# Patient Record
Sex: FEMALE | Race: ASIAN | NOT HISPANIC OR LATINO
[De-identification: names, ages, dates, MRNs, and addresses within clinical notes are randomized per-mention and may not be internally consistent; named-entity substitution may affect disease eponyms.]

---

## 2017-02-28 ENCOUNTER — APPOINTMENT (OUTPATIENT)
Dept: ANTEPARTUM | Facility: CLINIC | Age: 27
End: 2017-02-28

## 2017-02-28 ENCOUNTER — ASOB RESULT (OUTPATIENT)
Age: 27
End: 2017-02-28

## 2017-05-23 ENCOUNTER — ASOB RESULT (OUTPATIENT)
Age: 27
End: 2017-05-23

## 2017-05-23 ENCOUNTER — APPOINTMENT (OUTPATIENT)
Dept: ANTEPARTUM | Facility: CLINIC | Age: 27
End: 2017-05-23

## 2017-06-22 ENCOUNTER — OUTPATIENT (OUTPATIENT)
Dept: OUTPATIENT SERVICES | Facility: HOSPITAL | Age: 27
LOS: 1 days | End: 2017-06-22
Payer: MEDICAID

## 2017-06-22 ENCOUNTER — INPATIENT (INPATIENT)
Facility: HOSPITAL | Age: 27
LOS: 1 days | Discharge: HOME | End: 2017-06-24
Attending: OBSTETRICS & GYNECOLOGY | Admitting: OBSTETRICS & GYNECOLOGY

## 2017-06-22 VITALS — WEIGHT: 167.55 LBS | HEIGHT: 65 IN

## 2017-06-22 DIAGNOSIS — O26.899 OTHER SPECIFIED PREGNANCY RELATED CONDITIONS, UNSPECIFIED TRIMESTER: ICD-10-CM

## 2017-06-22 DIAGNOSIS — Z3A.00 WEEKS OF GESTATION OF PREGNANCY NOT SPECIFIED: ICD-10-CM

## 2017-06-22 LAB
BLD GP AB SCN SERPL QL: NEGATIVE — SIGNIFICANT CHANGE UP
HCT VFR BLD CALC: 40.7 % — SIGNIFICANT CHANGE UP (ref 34.5–45)
HGB BLD-MCNC: 14.1 G/DL — SIGNIFICANT CHANGE UP (ref 11.5–15.5)
MCHC RBC-ENTMCNC: 28.9 PG — SIGNIFICANT CHANGE UP (ref 27–34)
MCHC RBC-ENTMCNC: 34.6 % — SIGNIFICANT CHANGE UP (ref 32–36)
MCV RBC AUTO: 83.4 FL — SIGNIFICANT CHANGE UP (ref 80–100)
PLATELET # BLD AUTO: 343 K/UL — SIGNIFICANT CHANGE UP (ref 150–400)
PMV BLD: 10.9 FL — SIGNIFICANT CHANGE UP (ref 7–13)
RBC # BLD: 4.88 M/UL — SIGNIFICANT CHANGE UP (ref 3.8–5.2)
RBC # FLD: 15.3 % — HIGH (ref 10.3–14.5)
RH IG SCN BLD-IMP: POSITIVE — SIGNIFICANT CHANGE UP
WBC # BLD: 22.86 K/UL — HIGH (ref 3.8–10.5)
WBC # FLD AUTO: 22.86 K/UL — HIGH (ref 3.8–10.5)

## 2017-06-22 PROCEDURE — 99204 OFFICE O/P NEW MOD 45 MIN: CPT

## 2017-06-22 RX ORDER — SODIUM CHLORIDE 9 MG/ML
3 INJECTION INTRAMUSCULAR; INTRAVENOUS; SUBCUTANEOUS EVERY 8 HOURS
Qty: 0 | Refills: 0 | Status: DISCONTINUED | OUTPATIENT
Start: 2017-06-23 | End: 2017-06-24

## 2017-06-22 RX ORDER — IBUPROFEN 200 MG
600 TABLET ORAL EVERY 6 HOURS
Qty: 0 | Refills: 0 | Status: COMPLETED | OUTPATIENT
Start: 2017-06-23 | End: 2018-05-22

## 2017-06-22 RX ORDER — AER TRAVELER 0.5 G/1
1 SOLUTION RECTAL; TOPICAL EVERY 4 HOURS
Qty: 0 | Refills: 0 | Status: DISCONTINUED | OUTPATIENT
Start: 2017-06-23 | End: 2017-06-24

## 2017-06-22 RX ORDER — SODIUM CHLORIDE 9 MG/ML
3 INJECTION INTRAMUSCULAR; INTRAVENOUS; SUBCUTANEOUS EVERY 8 HOURS
Qty: 0 | Refills: 0 | Status: DISCONTINUED | OUTPATIENT
Start: 2017-06-22 | End: 2017-06-23

## 2017-06-22 RX ORDER — DIPHENHYDRAMINE HCL 50 MG
25 CAPSULE ORAL EVERY 6 HOURS
Qty: 0 | Refills: 0 | Status: DISCONTINUED | OUTPATIENT
Start: 2017-06-23 | End: 2017-06-24

## 2017-06-22 RX ORDER — DIBUCAINE 1 %
1 OINTMENT (GRAM) RECTAL EVERY 4 HOURS
Qty: 0 | Refills: 0 | Status: DISCONTINUED | OUTPATIENT
Start: 2017-06-23 | End: 2017-06-24

## 2017-06-22 RX ORDER — OXYTOCIN 10 UNIT/ML
41.67 VIAL (ML) INJECTION
Qty: 20 | Refills: 0 | Status: DISCONTINUED | OUTPATIENT
Start: 2017-06-22 | End: 2017-06-23

## 2017-06-22 RX ORDER — SODIUM CHLORIDE 9 MG/ML
1000 INJECTION, SOLUTION INTRAVENOUS
Qty: 0 | Refills: 0 | Status: DISCONTINUED | OUTPATIENT
Start: 2017-06-22 | End: 2017-06-22

## 2017-06-22 RX ORDER — MAGNESIUM HYDROXIDE 400 MG/1
30 TABLET, CHEWABLE ORAL
Qty: 0 | Refills: 0 | Status: DISCONTINUED | OUTPATIENT
Start: 2017-06-23 | End: 2017-06-24

## 2017-06-22 RX ORDER — TETANUS TOXOID, REDUCED DIPHTHERIA TOXOID AND ACELLULAR PERTUSSIS VACCINE, ADSORBED 5; 2.5; 8; 8; 2.5 [IU]/.5ML; [IU]/.5ML; UG/.5ML; UG/.5ML; UG/.5ML
0.5 SUSPENSION INTRAMUSCULAR ONCE
Qty: 0 | Refills: 0 | Status: DISCONTINUED | OUTPATIENT
Start: 2017-06-23 | End: 2017-06-24

## 2017-06-22 RX ORDER — OXYTOCIN 10 UNIT/ML
333.33 VIAL (ML) INJECTION
Qty: 20 | Refills: 0 | Status: COMPLETED | OUTPATIENT
Start: 2017-06-22

## 2017-06-22 RX ORDER — LANOLIN
1 OINTMENT (GRAM) TOPICAL EVERY 6 HOURS
Qty: 0 | Refills: 0 | Status: DISCONTINUED | OUTPATIENT
Start: 2017-06-23 | End: 2017-06-24

## 2017-06-22 RX ORDER — ACETAMINOPHEN 500 MG
975 TABLET ORAL EVERY 6 HOURS
Qty: 0 | Refills: 0 | Status: COMPLETED | OUTPATIENT
Start: 2017-06-23 | End: 2018-05-22

## 2017-06-22 RX ORDER — DOCUSATE SODIUM 100 MG
100 CAPSULE ORAL
Qty: 0 | Refills: 0 | Status: DISCONTINUED | OUTPATIENT
Start: 2017-06-23 | End: 2017-06-24

## 2017-06-22 RX ORDER — OXYTOCIN 10 UNIT/ML
333.33 VIAL (ML) INJECTION
Qty: 20 | Refills: 0 | Status: DISCONTINUED | OUTPATIENT
Start: 2017-06-22 | End: 2017-06-23

## 2017-06-22 RX ORDER — CITRIC ACID/SODIUM CITRATE 300-500 MG
15 SOLUTION, ORAL ORAL EVERY 4 HOURS
Qty: 0 | Refills: 0 | Status: DISCONTINUED | OUTPATIENT
Start: 2017-06-22 | End: 2017-06-22

## 2017-06-22 RX ORDER — SIMETHICONE 80 MG/1
80 TABLET, CHEWABLE ORAL EVERY 6 HOURS
Qty: 0 | Refills: 0 | Status: DISCONTINUED | OUTPATIENT
Start: 2017-06-23 | End: 2017-06-24

## 2017-06-22 RX ORDER — KETOROLAC TROMETHAMINE 30 MG/ML
30 SYRINGE (ML) INJECTION ONCE
Qty: 0 | Refills: 0 | Status: DISCONTINUED | OUTPATIENT
Start: 2017-06-23 | End: 2017-06-24

## 2017-06-22 RX ORDER — PRAMOXINE HYDROCHLORIDE 150 MG/15G
1 AEROSOL, FOAM RECTAL EVERY 4 HOURS
Qty: 0 | Refills: 0 | Status: DISCONTINUED | OUTPATIENT
Start: 2017-06-22 | End: 2017-06-23

## 2017-06-22 RX ORDER — OXYCODONE HYDROCHLORIDE 5 MG/1
5 TABLET ORAL
Qty: 0 | Refills: 0 | Status: DISCONTINUED | OUTPATIENT
Start: 2017-06-23 | End: 2017-06-24

## 2017-06-22 RX ORDER — HYDROCORTISONE 1 %
1 OINTMENT (GRAM) TOPICAL EVERY 4 HOURS
Qty: 0 | Refills: 0 | Status: DISCONTINUED | OUTPATIENT
Start: 2017-06-22 | End: 2017-06-23

## 2017-06-22 RX ORDER — OXYCODONE HYDROCHLORIDE 5 MG/1
5 TABLET ORAL EVERY 4 HOURS
Qty: 0 | Refills: 0 | Status: DISCONTINUED | OUTPATIENT
Start: 2017-06-23 | End: 2017-06-24

## 2017-06-22 RX ORDER — DIBUCAINE 1 %
1 OINTMENT (GRAM) RECTAL EVERY 4 HOURS
Qty: 0 | Refills: 0 | Status: DISCONTINUED | OUTPATIENT
Start: 2017-06-22 | End: 2017-06-23

## 2017-06-22 RX ORDER — SODIUM CHLORIDE 9 MG/ML
1000 INJECTION, SOLUTION INTRAVENOUS ONCE
Qty: 0 | Refills: 0 | Status: COMPLETED | OUTPATIENT
Start: 2017-06-22 | End: 2017-06-22

## 2017-06-22 RX ORDER — GLYCERIN ADULT
1 SUPPOSITORY, RECTAL RECTAL AT BEDTIME
Qty: 0 | Refills: 0 | Status: DISCONTINUED | OUTPATIENT
Start: 2017-06-23 | End: 2017-06-24

## 2017-06-22 RX ORDER — AER TRAVELER 0.5 G/1
1 SOLUTION RECTAL; TOPICAL EVERY 4 HOURS
Qty: 0 | Refills: 0 | Status: DISCONTINUED | OUTPATIENT
Start: 2017-06-22 | End: 2017-06-23

## 2017-06-22 RX ADMIN — SODIUM CHLORIDE 2000 MILLILITER(S): 9 INJECTION, SOLUTION INTRAVENOUS at 19:15

## 2017-06-22 RX ADMIN — SODIUM CHLORIDE 125 MILLILITER(S): 9 INJECTION, SOLUTION INTRAVENOUS at 19:51

## 2017-06-22 RX ADMIN — Medication 1000 MILLIUNIT(S)/MIN: at 22:56

## 2017-06-22 RX ADMIN — Medication 125 MILLIUNIT(S)/MIN: at 22:57

## 2017-06-23 LAB — T PALLIDUM AB TITR SER: NEGATIVE — SIGNIFICANT CHANGE UP

## 2017-06-23 RX ORDER — ACETAMINOPHEN 500 MG
3 TABLET ORAL
Qty: 0 | Refills: 0 | DISCHARGE
Start: 2017-06-23

## 2017-06-23 RX ORDER — HYDROCORTISONE 1 %
1 OINTMENT (GRAM) TOPICAL EVERY 4 HOURS
Qty: 0 | Refills: 0 | Status: DISCONTINUED | OUTPATIENT
Start: 2017-06-23 | End: 2017-06-24

## 2017-06-23 RX ORDER — PRAMOXINE HYDROCHLORIDE 150 MG/15G
1 AEROSOL, FOAM RECTAL EVERY 4 HOURS
Qty: 0 | Refills: 0 | Status: DISCONTINUED | OUTPATIENT
Start: 2017-06-23 | End: 2017-06-24

## 2017-06-23 RX ORDER — IBUPROFEN 200 MG
600 TABLET ORAL EVERY 6 HOURS
Qty: 0 | Refills: 0 | Status: DISCONTINUED | OUTPATIENT
Start: 2017-06-23 | End: 2017-06-24

## 2017-06-23 RX ORDER — HYDROCORTISONE 1 %
1 OINTMENT (GRAM) TOPICAL EVERY 4 HOURS
Qty: 0 | Refills: 0 | Status: DISCONTINUED | OUTPATIENT
Start: 2017-06-23 | End: 2017-06-23

## 2017-06-23 RX ORDER — IBUPROFEN 200 MG
1 TABLET ORAL
Qty: 0 | Refills: 0 | DISCHARGE
Start: 2017-06-23

## 2017-06-23 RX ORDER — PRAMOXINE HYDROCHLORIDE 150 MG/15G
1 AEROSOL, FOAM RECTAL EVERY 4 HOURS
Qty: 0 | Refills: 0 | Status: DISCONTINUED | OUTPATIENT
Start: 2017-06-23 | End: 2017-06-23

## 2017-06-23 RX ORDER — ACETAMINOPHEN 500 MG
975 TABLET ORAL EVERY 6 HOURS
Qty: 0 | Refills: 0 | Status: DISCONTINUED | OUTPATIENT
Start: 2017-06-23 | End: 2017-06-24

## 2017-06-23 RX ADMIN — Medication 1 TABLET(S): at 16:23

## 2017-06-23 RX ADMIN — Medication 600 MILLIGRAM(S): at 05:02

## 2017-06-23 RX ADMIN — Medication 975 MILLIGRAM(S): at 18:00

## 2017-06-23 RX ADMIN — Medication 975 MILLIGRAM(S): at 13:22

## 2017-06-23 RX ADMIN — Medication 975 MILLIGRAM(S): at 19:00

## 2017-06-23 RX ADMIN — Medication 975 MILLIGRAM(S): at 05:02

## 2017-06-23 RX ADMIN — Medication 600 MILLIGRAM(S): at 18:00

## 2017-06-23 RX ADMIN — Medication 975 MILLIGRAM(S): at 23:58

## 2017-06-23 RX ADMIN — Medication 600 MILLIGRAM(S): at 13:22

## 2017-06-23 RX ADMIN — Medication 975 MILLIGRAM(S): at 12:22

## 2017-06-23 RX ADMIN — Medication 100 MILLIGRAM(S): at 05:02

## 2017-06-23 RX ADMIN — Medication 600 MILLIGRAM(S): at 12:23

## 2017-06-23 RX ADMIN — Medication 600 MILLIGRAM(S): at 19:00

## 2017-06-23 NOTE — DISCHARGE NOTE OB - MATERIALS PROVIDED
Shaken Baby Prevention Handout/Guide to Postpartum Care/Vaccinations/Breastfeeding Log/Breastfeeding Guide and Packet/  Immunization Record/[facility] [place] Screening Program

## 2017-06-23 NOTE — PROGRESS NOTE ADULT - SUBJECTIVE AND OBJECTIVE BOX
Patient evaluated at bedside.   Patient's pain is well controlled with  Patient denies headache, dizziness, chest pain, palpitations, shortness of breath, nausea, vomiting, heavy vaginal bleeding or perineal discomfort.  Patient has been ambulating without assistance, voiding spontaneously, tolerating diet, and is breastfeeding.    Physical Exam:  Vital Signs Last 24 Hrs  T(C): 36.6, Max: 36.8 (06-22 @ 21:02)  T(F): 97.9, Max: 98.2 (06-22 @ 21:02)  HR: 86 (82 - 97)  BP: 121/66 (100/65 - 136/67)  BP(mean): --  RR: 16 (16 - 18)  SpO2: 98% (97% - 100%)  I&O's Summary    I & Os for current day (as of 23 Jun 2017 10:26)  =============================================  IN: 2125 ml / OUT: 2100 ml / NET: 25 ml      NAD, A+0 x 3                          14.1   22.86 )-----------( 343      ( 22 Jun 2017 19:17 )             40.7                 Assessment:  stable postpartum  labs wnl  breastfeeding well    Plan:  encourage ambulation and po fluids  Encourage breastfeeding  anticipate discharge home tomorrow

## 2017-06-23 NOTE — PROVIDER CONTACT NOTE (OTHER) - ASSESSMENT
Patient's bleeding is moderate, patient stated she has been voiding, and denies any discomfort other then cramping at the lower abdomen, patient also reports right groin pain when she bends her right leg, patient denies any calf pain or tenderness and positive pedal pulses are palpable bilaterally

## 2017-06-23 NOTE — DISCHARGE NOTE OB - PATIENT PORTAL LINK FT
“You can access the FollowHealth Patient Portal, offered by [facility], by registering with the following website: [email or web address]”

## 2017-06-23 NOTE — DISCHARGE NOTE OB - CARE PROVIDER_API CALL
Daya Martinez), Gynecology [facility] Obstetrics  Gynecology Obstetrics and Gynecology  [address] [place] [address]  Phone: [phone]  Fax: [phone]

## 2017-06-24 VITALS
HEART RATE: 70 BPM | OXYGEN SATURATION: 99 % | SYSTOLIC BLOOD PRESSURE: 103 MMHG | RESPIRATION RATE: 16 BRPM | TEMPERATURE: 98 F | DIASTOLIC BLOOD PRESSURE: 71 MMHG

## 2017-06-24 RX ADMIN — Medication 600 MILLIGRAM(S): at 05:31

## 2017-06-24 RX ADMIN — Medication 975 MILLIGRAM(S): at 05:31

## 2017-06-24 RX ADMIN — Medication 600 MILLIGRAM(S): at 06:27

## 2017-06-24 RX ADMIN — Medication 975 MILLIGRAM(S): at 01:00

## 2017-06-24 RX ADMIN — Medication 975 MILLIGRAM(S): at 06:26

## 2017-06-24 NOTE — PROGRESS NOTE ADULT - SUBJECTIVE AND OBJECTIVE BOX
Patient evaluated at bedside.   Patient's pain is well controlled   Patient denies headache, dizziness, chest pain, palpitations, shortness of breath, nausea, vomiting, heavy vaginal bleeding or perineal discomfort.  Patient has been ambulating without assistance, voiding spontaneously, tolerating diet, and is breastfeeding.    Physical Exam:  Vital Signs Last 24 Hrs  T(C): 36.5, Max: 36.7 (06-23 @ 10:38)  T(F): 97.7, Max: 98.1 (06-23 @ 10:38)  HR: 70 (70 - 91)  BP: 103/71 (103/71 - 127/62)  BP(mean): --  RR: 16 (16 - 17)  SpO2: 99% (98% - 99%)  I&O's Summary    I & Os for current day (as of 24 Jun 2017 08:36)  =============================================  IN: 0 ml / OUT: 900 ml / NET: -900 ml      NAD, A+0 x 3                          14.1   22.86 )-----------( 343      ( 22 Jun 2017 19:17 )             40.7                 Assessment:  stable postpartum  labs wnl  breastfeeding well    Plan:  encourage ambulation and po fluids  Encourage breastfeeding  discharge home

## 2017-06-24 NOTE — LACTATION INITIAL EVALUATION - LATCH: HOLD (POSITIONING) INFANT
full assist (staff holds infant at breast) minimal assist, teach one side; mother does other, staff holds

## 2017-06-24 NOTE — LACTATION INITIAL EVALUATION - LACTATION INTERVENTIONS
initiate skin to skin/initiate hand expression routine/assisted with deep latch and positioning  discussed  signs  of  effective  feeding and  swallowing.  discussed  compression at  breast when  nbn  stops  drinking  and  is  still sucking.  , if  nbn  not  breastfeeding  effectively  hand  express  and  pump  and   give  teaspoons  between  feedings alternative  feeding   method.

## 2017-06-24 NOTE — LACTATION INITIAL EVALUATION - INTERVENTION OUTCOME
nbn demonstrated  deep latch and  performed  with sucking and swallowing  noted   pt instructed to notify pediatrician if nbn not feeding 8-12 times/24 hours and not voiding and stooling according to breastfeeding log.   ,  follow  up outside  lactation  resources  given  ./verbalizes understanding

## 2019-12-27 ENCOUNTER — EMERGENCY (EMERGENCY)
Facility: HOSPITAL | Age: 29
LOS: 1 days | Discharge: HOME | End: 2019-12-27
Attending: EMERGENCY MEDICINE | Admitting: EMERGENCY MEDICINE
Payer: COMMERCIAL

## 2019-12-27 VITALS
HEART RATE: 91 BPM | SYSTOLIC BLOOD PRESSURE: 116 MMHG | DIASTOLIC BLOOD PRESSURE: 75 MMHG | TEMPERATURE: 98 F | OXYGEN SATURATION: 99 % | RESPIRATION RATE: 18 BRPM | WEIGHT: 145.06 LBS

## 2019-12-27 LAB
APPEARANCE UR: CLEAR — SIGNIFICANT CHANGE UP
BACTERIA # UR AUTO: ABNORMAL
BILIRUB UR-MCNC: NEGATIVE — SIGNIFICANT CHANGE UP
COLOR SPEC: SIGNIFICANT CHANGE UP
DIFF PNL FLD: ABNORMAL
EPI CELLS # UR: SIGNIFICANT CHANGE UP
GLUCOSE UR QL: NEGATIVE — SIGNIFICANT CHANGE UP
HCG UR QL: NEGATIVE — SIGNIFICANT CHANGE UP
KETONES UR-MCNC: NEGATIVE — SIGNIFICANT CHANGE UP
LEUKOCYTE ESTERASE UR-ACNC: ABNORMAL
NITRITE UR-MCNC: NEGATIVE — SIGNIFICANT CHANGE UP
PH UR: 7 — SIGNIFICANT CHANGE UP (ref 5–8)
PROT UR-MCNC: NEGATIVE — SIGNIFICANT CHANGE UP
RBC CASTS # UR COMP ASSIST: >50 /HPF (ref 0–4)
SP GR SPEC: 1 — LOW (ref 1.01–1.02)
UROBILINOGEN FLD QL: NEGATIVE — SIGNIFICANT CHANGE UP
WBC UR QL: >50

## 2019-12-27 PROCEDURE — 87086 URINE CULTURE/COLONY COUNT: CPT

## 2019-12-27 PROCEDURE — 99283 EMERGENCY DEPT VISIT LOW MDM: CPT

## 2019-12-27 PROCEDURE — 81025 URINE PREGNANCY TEST: CPT

## 2019-12-27 PROCEDURE — 81001 URINALYSIS AUTO W/SCOPE: CPT

## 2019-12-27 RX ORDER — PHENAZOPYRIDINE HCL 100 MG
1 TABLET ORAL
Qty: 6 | Refills: 0
Start: 2019-12-27 | End: 2019-12-28

## 2019-12-27 RX ORDER — CEFUROXIME AXETIL 250 MG
1 TABLET ORAL
Qty: 14 | Refills: 0
Start: 2019-12-27 | End: 2020-01-02

## 2019-12-27 RX ORDER — CEFUROXIME AXETIL 250 MG
500 TABLET ORAL ONCE
Refills: 0 | Status: COMPLETED | OUTPATIENT
Start: 2019-12-27 | End: 2019-12-27

## 2019-12-27 RX ORDER — PHENAZOPYRIDINE HCL 100 MG
200 TABLET ORAL ONCE
Refills: 0 | Status: COMPLETED | OUTPATIENT
Start: 2019-12-27 | End: 2019-12-27

## 2019-12-27 RX ADMIN — Medication 200 MILLIGRAM(S): at 13:21

## 2019-12-27 RX ADMIN — Medication 500 MILLIGRAM(S): at 13:21

## 2019-12-27 NOTE — ED PROVIDER NOTE - OBJECTIVE STATEMENT
Pt is a 28 yo female with no pmhx c/o of dysuria increased urinary urgency frequency only small amount of urine coming out since last night denies any flank pain nvd fever chills + red blood in urine no hx of kidney stones. Pt denies any vaginal discharge

## 2019-12-27 NOTE — ED ADULT NURSE NOTE - OBJECTIVE STATEMENT
Received  Pt in bed #3A Pt a&o C/O urinary frequency, urgency & burning since last night Pt seen by LISETH Bonilla

## 2019-12-27 NOTE — ED PROVIDER NOTE - NSFOLLOWUPINSTRUCTIONS_ED_ALL_ED_FT
Please follow up with pcp drink plenty of water return for fever worsening symptoms vomiting take medications as directed

## 2019-12-27 NOTE — ED PROVIDER NOTE - ATTENDING CONTRIBUTION TO CARE
30 yo female no pmhx c/o frequency urgency dysuria since last night, noted to have some hematuria.  No fever/chills, no back pain, no nausea/vomiting    Gen: Alert, NAD  Head/eyes: NC/AT, PERRL, EOMI  ENT: airway patent  Neck: supple, no tenderness/meningismus/JVD, Trachea midline  Pulm/lung: Bilateral clear BS, normal resp effort, no wheeze/stridor/retractions  CV/heart: RRR, no M/R/G, +2 dist pulses (radial, pedal DP/PT, popliteal)  GI/Abd: soft, NT/ND, +BS, no guarding/rebound tenderness  Musculoskeletal: no edema/erythema/cyanosis, FROM in all extremities, no C/T/L spine ttp, no CVAT b/l   Skin: no rash, no vesicles, no petechaie, no ecchymosis, no swelling  Neuro: AAOx3, CN 2-12 intact, normal sensation, 5/5 motor strength in all extremities, normal gait, no dysmetria     +UTI, PO abx, understands to return if any worsening symptoms

## 2019-12-27 NOTE — ED PROVIDER NOTE - PATIENT PORTAL LINK FT
You can access the FollowMyHealth Patient Portal offered by [facility] by registering at the following website: [email or web address]. By joining [employer]’s FollowMyHealth portal, you will also be able to view your health information using other applications (apps) compatible with our system.

## 2019-12-27 NOTE — ED ADULT NURSE NOTE - IN THE PAST 12 MONTHS HAVE YOU USED DRUGS OTHER THAN THOSE REQUIRED FOR MEDICAL REASON?
[Clear TM bilaterally] : clear tympanic membranes bilaterally [Clear Rhinorrhea] : clear rhinorrhea [Transmitted Upper Airway Sounds] : transmitted upper airway sounds [NL] : warm [Irritable] : irritable [FreeTextEntry7] : very frequent cough during the exam  great deal of loose secretions No

## 2019-12-28 LAB
CULTURE RESULTS: SIGNIFICANT CHANGE UP
SPECIMEN SOURCE: SIGNIFICANT CHANGE UP

## 2021-03-01 ENCOUNTER — APPOINTMENT (OUTPATIENT)
Dept: ANTEPARTUM | Facility: CLINIC | Age: 31
End: 2021-03-01
Payer: COMMERCIAL

## 2021-03-01 ENCOUNTER — ASOB RESULT (OUTPATIENT)
Age: 31
End: 2021-03-01

## 2021-03-01 DIAGNOSIS — O35.1XX0 MATERNAL CARE FOR (SUSPECTED) CHROMOSOMAL ABNORMALITY IN FETUS, NOT APPLICABLE OR UNSPECIFIED: ICD-10-CM

## 2021-03-01 PROCEDURE — 99072 ADDL SUPL MATRL&STAF TM PHE: CPT

## 2021-03-01 PROCEDURE — 76813 OB US NUCHAL MEAS 1 GEST: CPT | Mod: 59

## 2021-03-01 PROCEDURE — 36416 COLLJ CAPILLARY BLOOD SPEC: CPT

## 2021-03-05 LAB
1ST TRIMESTER DATA: NORMAL
ADDENDUM DOC: NORMAL
AFP PNL SERPL: NORMAL
AFP SERPL-ACNC: NORMAL
CLINICAL BIOCHEMIST REVIEW: NORMAL
FREE BETA HCG 1ST TRIMESTER: NORMAL
Lab: NORMAL
NOTES NTD: NORMAL
NT: NORMAL
PAPP-A SERPL-ACNC: NORMAL
TRISOMY 18/3: NORMAL

## 2021-04-19 ENCOUNTER — APPOINTMENT (OUTPATIENT)
Dept: ANTEPARTUM | Facility: CLINIC | Age: 31
End: 2021-04-19

## 2021-04-29 ENCOUNTER — ASOB RESULT (OUTPATIENT)
Age: 31
End: 2021-04-29

## 2021-04-29 ENCOUNTER — APPOINTMENT (OUTPATIENT)
Dept: ANTEPARTUM | Facility: CLINIC | Age: 31
End: 2021-04-29
Payer: COMMERCIAL

## 2021-04-29 PROCEDURE — 76805 OB US >/= 14 WKS SNGL FETUS: CPT

## 2021-04-29 PROCEDURE — 99072 ADDL SUPL MATRL&STAF TM PHE: CPT

## 2021-07-08 ENCOUNTER — APPOINTMENT (OUTPATIENT)
Dept: ANTEPARTUM | Facility: CLINIC | Age: 31
End: 2021-07-08

## 2021-07-14 ENCOUNTER — APPOINTMENT (OUTPATIENT)
Dept: ANTEPARTUM | Facility: CLINIC | Age: 31
End: 2021-07-14
Payer: COMMERCIAL

## 2021-07-14 ENCOUNTER — ASOB RESULT (OUTPATIENT)
Age: 31
End: 2021-07-14

## 2021-07-14 ENCOUNTER — TRANSCRIPTION ENCOUNTER (OUTPATIENT)
Age: 31
End: 2021-07-14

## 2021-07-14 PROCEDURE — 76816 OB US FOLLOW-UP PER FETUS: CPT

## 2021-07-14 PROCEDURE — 99072 ADDL SUPL MATRL&STAF TM PHE: CPT

## 2021-08-18 ENCOUNTER — OUTPATIENT (OUTPATIENT)
Dept: INPATIENT UNIT | Facility: HOSPITAL | Age: 31
LOS: 1 days | Discharge: HOME | End: 2021-08-18
Payer: MEDICAID

## 2021-08-18 ENCOUNTER — INPATIENT (INPATIENT)
Facility: HOSPITAL | Age: 31
LOS: 0 days | Discharge: HOME | End: 2021-08-19
Attending: OBSTETRICS & GYNECOLOGY | Admitting: OBSTETRICS & GYNECOLOGY

## 2021-08-18 ENCOUNTER — TRANSCRIPTION ENCOUNTER (OUTPATIENT)
Age: 31
End: 2021-08-18

## 2021-08-18 VITALS
DIASTOLIC BLOOD PRESSURE: 74 MMHG | TEMPERATURE: 98 F | SYSTOLIC BLOOD PRESSURE: 148 MMHG | HEART RATE: 100 BPM | RESPIRATION RATE: 16 BRPM

## 2021-08-18 VITALS — HEART RATE: 93 BPM | DIASTOLIC BLOOD PRESSURE: 80 MMHG | SYSTOLIC BLOOD PRESSURE: 134 MMHG

## 2021-08-18 VITALS — SYSTOLIC BLOOD PRESSURE: 132 MMHG | DIASTOLIC BLOOD PRESSURE: 66 MMHG | HEART RATE: 82 BPM

## 2021-08-18 DIAGNOSIS — Z3A.00 WEEKS OF GESTATION OF PREGNANCY NOT SPECIFIED: ICD-10-CM

## 2021-08-18 DIAGNOSIS — O26.899 OTHER SPECIFIED PREGNANCY RELATED CONDITIONS, UNSPECIFIED TRIMESTER: ICD-10-CM

## 2021-08-18 LAB
BASOPHILS # BLD AUTO: 0.16 K/UL — SIGNIFICANT CHANGE UP (ref 0–0.2)
BASOPHILS NFR BLD AUTO: 0.7 % — SIGNIFICANT CHANGE UP (ref 0–2)
BLD GP AB SCN SERPL QL: NEGATIVE — SIGNIFICANT CHANGE UP
COVID-19 SPIKE DOMAIN AB INTERP: NEGATIVE — SIGNIFICANT CHANGE UP
COVID-19 SPIKE DOMAIN AB INTERP: NEGATIVE — SIGNIFICANT CHANGE UP
COVID-19 SPIKE DOMAIN ANTIBODY RESULT: 0.4 U/ML — SIGNIFICANT CHANGE UP
COVID-19 SPIKE DOMAIN ANTIBODY RESULT: 0.4 U/ML — SIGNIFICANT CHANGE UP
EOSINOPHIL # BLD AUTO: 0.53 K/UL — HIGH (ref 0–0.5)
EOSINOPHIL NFR BLD AUTO: 2.3 % — SIGNIFICANT CHANGE UP (ref 0–6)
HCT VFR BLD CALC: 40.2 % — SIGNIFICANT CHANGE UP (ref 34.5–45)
HGB BLD-MCNC: 13.6 G/DL — SIGNIFICANT CHANGE UP (ref 11.5–15.5)
HIV 1+2 AB+HIV1 P24 AG SERPL QL IA: SIGNIFICANT CHANGE UP
IANC: 15.11 K/UL — HIGH (ref 1.5–8.5)
IMM GRANULOCYTES NFR BLD AUTO: 2.1 % — HIGH (ref 0–1.5)
LYMPHOCYTES # BLD AUTO: 23.7 % — SIGNIFICANT CHANGE UP (ref 13–44)
LYMPHOCYTES # BLD AUTO: 5.52 K/UL — HIGH (ref 1–3.3)
MCHC RBC-ENTMCNC: 26.5 PG — LOW (ref 27–34)
MCHC RBC-ENTMCNC: 33.8 GM/DL — SIGNIFICANT CHANGE UP (ref 32–36)
MCV RBC AUTO: 78.4 FL — LOW (ref 80–100)
MONOCYTES # BLD AUTO: 1.52 K/UL — HIGH (ref 0–0.9)
MONOCYTES NFR BLD AUTO: 6.5 % — SIGNIFICANT CHANGE UP (ref 2–14)
NEUTROPHILS # BLD AUTO: 15.11 K/UL — HIGH (ref 1.8–7.4)
NEUTROPHILS NFR BLD AUTO: 64.7 % — SIGNIFICANT CHANGE UP (ref 43–77)
NRBC # BLD: 0 /100 WBCS — SIGNIFICANT CHANGE UP
NRBC # FLD: 0 K/UL — SIGNIFICANT CHANGE UP
PLATELET # BLD AUTO: 380 K/UL — SIGNIFICANT CHANGE UP (ref 150–400)
RBC # BLD: 5.13 M/UL — SIGNIFICANT CHANGE UP (ref 3.8–5.2)
RBC # FLD: 19.4 % — HIGH (ref 10.3–14.5)
RH IG SCN BLD-IMP: POSITIVE — SIGNIFICANT CHANGE UP
SARS-COV-2 IGG+IGM SERPL QL IA: 0.4 U/ML — SIGNIFICANT CHANGE UP
SARS-COV-2 IGG+IGM SERPL QL IA: 0.4 U/ML — SIGNIFICANT CHANGE UP
SARS-COV-2 IGG+IGM SERPL QL IA: NEGATIVE — SIGNIFICANT CHANGE UP
SARS-COV-2 IGG+IGM SERPL QL IA: NEGATIVE — SIGNIFICANT CHANGE UP
SARS-COV-2 RNA SPEC QL NAA+PROBE: SIGNIFICANT CHANGE UP
T PALLIDUM AB TITR SER: NEGATIVE — SIGNIFICANT CHANGE UP
WBC # BLD: 23.32 K/UL — HIGH (ref 3.8–10.5)
WBC # FLD AUTO: 23.32 K/UL — HIGH (ref 3.8–10.5)

## 2021-08-18 PROCEDURE — 59025 FETAL NON-STRESS TEST: CPT | Mod: 26

## 2021-08-18 PROCEDURE — 99213 OFFICE O/P EST LOW 20 MIN: CPT | Mod: 25

## 2021-08-18 RX ORDER — IBUPROFEN 200 MG
600 TABLET ORAL EVERY 6 HOURS
Refills: 0 | Status: DISCONTINUED | OUTPATIENT
Start: 2021-08-18 | End: 2021-08-19

## 2021-08-18 RX ORDER — BENZOCAINE 10 %
1 GEL (GRAM) MUCOUS MEMBRANE EVERY 6 HOURS
Refills: 0 | Status: DISCONTINUED | OUTPATIENT
Start: 2021-08-18 | End: 2021-08-19

## 2021-08-18 RX ORDER — OXYCODONE HYDROCHLORIDE 5 MG/1
5 TABLET ORAL
Refills: 0 | Status: DISCONTINUED | OUTPATIENT
Start: 2021-08-18 | End: 2021-08-19

## 2021-08-18 RX ORDER — OXYTOCIN 10 UNIT/ML
VIAL (ML) INJECTION
Qty: 20 | Refills: 0 | Status: DISCONTINUED | OUTPATIENT
Start: 2021-08-18 | End: 2021-08-18

## 2021-08-18 RX ORDER — LANOLIN
1 OINTMENT (GRAM) TOPICAL EVERY 6 HOURS
Refills: 0 | Status: DISCONTINUED | OUTPATIENT
Start: 2021-08-18 | End: 2021-08-19

## 2021-08-18 RX ORDER — SODIUM CHLORIDE 9 MG/ML
3 INJECTION INTRAMUSCULAR; INTRAVENOUS; SUBCUTANEOUS EVERY 8 HOURS
Refills: 0 | Status: DISCONTINUED | OUTPATIENT
Start: 2021-08-18 | End: 2021-08-19

## 2021-08-18 RX ORDER — DIBUCAINE 1 %
1 OINTMENT (GRAM) RECTAL EVERY 6 HOURS
Refills: 0 | Status: DISCONTINUED | OUTPATIENT
Start: 2021-08-18 | End: 2021-08-19

## 2021-08-18 RX ORDER — OXYCODONE HYDROCHLORIDE 5 MG/1
5 TABLET ORAL ONCE
Refills: 0 | Status: DISCONTINUED | OUTPATIENT
Start: 2021-08-18 | End: 2021-08-19

## 2021-08-18 RX ORDER — HYDROCORTISONE 1 %
1 OINTMENT (GRAM) TOPICAL EVERY 6 HOURS
Refills: 0 | Status: DISCONTINUED | OUTPATIENT
Start: 2021-08-18 | End: 2021-08-19

## 2021-08-18 RX ORDER — MAGNESIUM HYDROXIDE 400 MG/1
30 TABLET, CHEWABLE ORAL
Refills: 0 | Status: DISCONTINUED | OUTPATIENT
Start: 2021-08-18 | End: 2021-08-19

## 2021-08-18 RX ORDER — SIMETHICONE 80 MG/1
80 TABLET, CHEWABLE ORAL EVERY 4 HOURS
Refills: 0 | Status: DISCONTINUED | OUTPATIENT
Start: 2021-08-18 | End: 2021-08-19

## 2021-08-18 RX ORDER — KETOROLAC TROMETHAMINE 30 MG/ML
30 SYRINGE (ML) INJECTION ONCE
Refills: 0 | Status: DISCONTINUED | OUTPATIENT
Start: 2021-08-18 | End: 2021-08-18

## 2021-08-18 RX ORDER — AER TRAVELER 0.5 G/1
1 SOLUTION RECTAL; TOPICAL EVERY 4 HOURS
Refills: 0 | Status: DISCONTINUED | OUTPATIENT
Start: 2021-08-18 | End: 2021-08-19

## 2021-08-18 RX ORDER — IBUPROFEN 200 MG
600 TABLET ORAL EVERY 6 HOURS
Refills: 0 | Status: COMPLETED | OUTPATIENT
Start: 2021-08-18 | End: 2022-07-17

## 2021-08-18 RX ORDER — SODIUM CHLORIDE 9 MG/ML
1000 INJECTION, SOLUTION INTRAVENOUS
Refills: 0 | Status: DISCONTINUED | OUTPATIENT
Start: 2021-08-18 | End: 2021-08-18

## 2021-08-18 RX ORDER — PRAMOXINE HYDROCHLORIDE 150 MG/15G
1 AEROSOL, FOAM RECTAL EVERY 4 HOURS
Refills: 0 | Status: DISCONTINUED | OUTPATIENT
Start: 2021-08-18 | End: 2021-08-19

## 2021-08-18 RX ORDER — TETANUS TOXOID, REDUCED DIPHTHERIA TOXOID AND ACELLULAR PERTUSSIS VACCINE, ADSORBED 5; 2.5; 8; 8; 2.5 [IU]/.5ML; [IU]/.5ML; UG/.5ML; UG/.5ML; UG/.5ML
0.5 SUSPENSION INTRAMUSCULAR ONCE
Refills: 0 | Status: DISCONTINUED | OUTPATIENT
Start: 2021-08-18 | End: 2021-08-19

## 2021-08-18 RX ORDER — ACETAMINOPHEN 500 MG
975 TABLET ORAL
Refills: 0 | Status: DISCONTINUED | OUTPATIENT
Start: 2021-08-18 | End: 2021-08-19

## 2021-08-18 RX ORDER — OXYTOCIN 10 UNIT/ML
333.33 VIAL (ML) INJECTION
Qty: 20 | Refills: 0 | Status: DISCONTINUED | OUTPATIENT
Start: 2021-08-18 | End: 2021-08-19

## 2021-08-18 RX ORDER — DIPHENHYDRAMINE HCL 50 MG
25 CAPSULE ORAL EVERY 6 HOURS
Refills: 0 | Status: DISCONTINUED | OUTPATIENT
Start: 2021-08-18 | End: 2021-08-19

## 2021-08-18 RX ADMIN — Medication 1000 MILLIUNIT(S)/MIN: at 08:32

## 2021-08-18 RX ADMIN — Medication 600 MILLIGRAM(S): at 15:30

## 2021-08-18 RX ADMIN — Medication 30 MILLIGRAM(S): at 08:32

## 2021-08-18 RX ADMIN — Medication 30 MILLIGRAM(S): at 07:10

## 2021-08-18 RX ADMIN — Medication 975 MILLIGRAM(S): at 23:11

## 2021-08-18 RX ADMIN — Medication 600 MILLIGRAM(S): at 14:55

## 2021-08-18 NOTE — DISCHARGE NOTE OB - MEDICATION SUMMARY - MEDICATIONS TO STOP TAKING
I will STOP taking the medications listed below when I get home from the hospital:  None I will STOP taking the medications listed below when I get home from the hospital:    Pyridium 100 mg oral tablet  -- 1 tab(s) by mouth 3 times a day   -- May discolor urine or feces.  Medication should be taken with plenty of water.  Take with food or milk.    cefuroxime 500 mg oral tablet  -- 1 tab(s) by mouth 2 times a day   -- Finish all this medication unless otherwise directed by prescriber.  Medication should be taken with plenty of water.  Take with food or milk.

## 2021-08-18 NOTE — OB PROVIDER TRIAGE NOTE - NSOBPROVIDERNOTE_OBGYN_ALL_OB_FT
30y  at 37w4d, no evidence of labor at this time  D/w Dr Mayfield  D/c home with instructions   labor precautions  pt to monitor fetal kick counts  Pt to follow up with OB as scheduled  Pt to increase PO hydration

## 2021-08-18 NOTE — DISCHARGE NOTE OB - CARE PROVIDER_API CALL
Christine Martin)  Obstetrics and Gynecology  [address], [address]  [place] [address]  Phone: [phone]  Fax: [phone]  Follow Up Time:

## 2021-08-18 NOTE — OB PROVIDER TRIAGE NOTE - ADDITIONAL INSTRUCTIONS
D/c home with instructions   labor precautions  pt to monitor fetal kick counts  Pt to follow up with OB as scheduled  Pt to increase PO hydration

## 2021-08-18 NOTE — OB PROVIDER TRIAGE NOTE - NSHPPHYSICALEXAM_GEN_ALL_CORE
T(C): 36.8 (08-18-21 @ 00:51), Max: 36.8 (08-18-21 @ 00:51)  HR: 90 (08-18-21 @ 01:33) (90 - 100)  BP: 126/71 (08-18-21 @ 01:33) (126/71 - 148/74)  RR: 16 (08-18-21 @ 00:51) (16 - 16)    Heart: RRR  Lungs: CTA  Abdomen: Gravid, soft, NT    NST: Reactive with moderate variability, Category 1 tracing  [place]: Irregular contractions  VE: 1.5/60/-3, intact membranes

## 2021-08-18 NOTE — OB PROVIDER DELIVERY SUMMARY - NSSELHIDDEN_OBGYN_ALL_OB_FT
[NS_DeliveryAttending1_OBGYN_ALL_OB_FT:[id number]],[NS_DeliveryAssist1_OBGYN_ALL_OB_FT:[id number]],[NS_DeliveryRN_OBGYN_ALL_OB_FT:[id number]],[NS_CirculateRN2_OBGYN_ALL_OB_FT:[id number]]

## 2021-08-18 NOTE — DISCHARGE NOTE OB - PATIENT PORTAL LINK FT
You can access the FollowMyHealth Patient Portal offered by [facility] by registering at the following website: [email or web address]. By joining [employer]’s FollowMyHealth portal, you will also be able to view your health information using other applications (apps) compatible with our system.

## 2021-08-18 NOTE — OB RN PATIENT PROFILE - AS SC BRADEN ACTIVITY
Vitals stable, NPASS <3, SHIRA scores 4 or less, no spells. Voiding and stooling. Tolerating oral feedings well and takes % of minimum feeding. Weight gain of 32g. IDF 80% yesterday. Continue to monitor withdrawal symptoms.    (4) walks frequently

## 2021-08-18 NOTE — DISCHARGE NOTE OB - MEDICATION SUMMARY - MEDICATIONS TO TAKE
I will START or STAY ON the medications listed below when I get home from the hospital:  None I will START or STAY ON the medications listed below when I get home from the hospital:    ibuprofen 600 mg oral tablet  -- 1 tab(s) by mouth every 6 hours, As Needed  -- Indication: For pain    acetaminophen 325 mg oral tablet  -- 3 tab(s) by mouth , As Needed  -- Indication: For pain    Prenatal Multivitamins with Folic Acid 1 mg oral tablet  -- 1 tab(s) by mouth once a day  -- Indication: For Vitamins

## 2021-08-18 NOTE — OB RN DELIVERY SUMMARY - NS_SEPSISRSKCALC_OBGYN_ALL_OB_FT
GBS status in the 'Prenatal Lab tests/results section' on the OB RN Patient Profile must be documented.   EOS calculated successfully. EOS Risk Factor: 0.5/1000 live births ([facility] national incidence); GA=37w4d; Temp=98.2; ROM=0.017; GBS='Unknown'; Antibiotics='No antibiotics or any antibiotics < 2 hrs prior to birth'

## 2021-08-18 NOTE — OB PROVIDER H&P - HISTORY OF PRESENT ILLNESS
Pt. is a 30y  at 37w4d returns to triage with painful contractions.   Reports +FM, no vaginal bleeding, no ROM or LOF  Prenatal care: Dr Martin; Denies any prenatal complications

## 2021-08-18 NOTE — OB RN DELIVERY SUMMARY - NSSELHIDDEN_OBGYN_ALL_OB_FT
[NS_DeliveryAttending1_OBGYN_ALL_OB_FT:[id number]],[NS_DeliveryAssist1_OBGYN_ALL_OB_FT:[id number]],[NS_DeliveryRN_OBGYN_ALL_OB_FT:[id number]] [NS_DeliveryAttending1_OBGYN_ALL_OB_FT:[id number]],[NS_DeliveryAssist1_OBGYN_ALL_OB_FT:[id number]],[NS_DeliveryRN_OBGYN_ALL_OB_FT:[id number]=],[NS_CirculateRN2_OBGYN_ALL_OB_FT:[id number]]

## 2021-08-18 NOTE — OB PROVIDER DELIVERY SUMMARY - NSPROVIDERDELIVERYNOTE_OBGYN_ALL_OB_FT
Spontaneous vaginal delivery of liveborn infant from DENIS position. Head, shoulders, and body delivered easily. Infant was suctioned. No mec. Cord was clamped and cut. Placenta delivered intact. Fundal massage given and uterine fundus was found to be firm. Vaginal exam revealed an intact cervix, vaginal walls and sulci, and perineum. Patient was stable and went to recovery. Count was correct x 2.

## 2021-08-18 NOTE — OB RN PATIENT PROFILE - GRAVIDA, OB PROFILE
How Severe Are Your Spot(S)?: mild What Is The Reason For Today's Visit?: Full Body Skin Examination What Is The Reason For Today's Visit? (Being Monitored For X): the development of a new lesion 4

## 2021-08-18 NOTE — OB PROVIDER H&P - ASSESSMENT
Dr. Mayfield made aware  Pt. transferred to LDR#5 via stretcher  Routine and COVID ordered   Unable to obtain consents and COVID swab

## 2021-08-18 NOTE — DISCHARGE NOTE OB - MATERIALS PROVIDED
Vaccinations/[facility]  Screening Program/  Immunization Record/Breastfeeding Log/Breastfeeding Mother’s Support Group Information/Guide to Postpartum Care/[facility] Hearing Screen Program/Back To Sleep Handout/Shaken Baby Prevention Handout/Breastfeeding Guide and Packet/Birth Certificate Instructions/Discharge Medication Information for Patients and Families Pocket Guide

## 2021-08-18 NOTE — OB PROVIDER TRIAGE NOTE - HISTORY OF PRESENT ILLNESS
30y  at 37w4d presents to triage c/o ctx q 15-20mins apart  Reports +FM, no vaginal bleeding, no ROM or LOF  Prenatal care: Dr Martin; Denies any prenatal complications

## 2021-08-19 VITALS
OXYGEN SATURATION: 100 % | TEMPERATURE: 98 F | DIASTOLIC BLOOD PRESSURE: 84 MMHG | RESPIRATION RATE: 18 BRPM | HEART RATE: 93 BPM | SYSTOLIC BLOOD PRESSURE: 130 MMHG

## 2021-08-19 RX ORDER — ACETAMINOPHEN 500 MG
3 TABLET ORAL
Qty: 0 | Refills: 0 | DISCHARGE
Start: 2021-08-19

## 2021-08-19 RX ORDER — IBUPROFEN 200 MG
1 TABLET ORAL
Qty: 0 | Refills: 0 | DISCHARGE
Start: 2021-08-19

## 2021-08-19 RX ADMIN — Medication 600 MILLIGRAM(S): at 01:29

## 2021-08-19 RX ADMIN — Medication 600 MILLIGRAM(S): at 07:15

## 2021-08-19 RX ADMIN — Medication 975 MILLIGRAM(S): at 00:27

## 2021-08-19 RX ADMIN — Medication 600 MILLIGRAM(S): at 00:30

## 2021-08-19 RX ADMIN — Medication 600 MILLIGRAM(S): at 06:23

## 2021-08-19 NOTE — PROGRESS NOTE ADULT - SUBJECTIVE AND OBJECTIVE BOX
S: Patient doing well. Minimal lochia. Pain controlled.    O: Vital Signs Last 24 Hrs  T(C): 36.7 (19 Aug 2021 05:27), Max: 36.9 (18 Aug 2021 14:15)  T(F): 98 (19 Aug 2021 05:27), Max: 98.4 (18 Aug 2021 14:15)  HR: 78 (19 Aug 2021 05:27) (78 - 100)  BP: 108/65 (19 Aug 2021 05:27) (108/65 - 132/68)  BP(mean): --  RR: 18 (19 Aug 2021 05:27) (18 - 19)  SpO2: 99% (19 Aug 2021 05:27) (98% - 100%)    Gen: NAD  Abd: soft, NT, ND, fundus firm below umbilicus  Lochia: moderate  Ext: no tenderness    Labs:                        13.6   23.32 )-----------( 380      ( 18 Aug 2021 08:16 )             40.2       A: 30y PPD#1 s/p  doing well.  Plan: Routine care Dc with instructions

## 2022-08-16 NOTE — PATIENT PROFILE OB - TOBACCO USE
General Call    Contacts       Type Contact Phone/Fax    08/15/2022 10:21 AM CDT Phone (Incoming) Casandra Puentes (Emergency Contact) [phone]        Reason for Call:  Hospice Care    What are your questions or concerns:  Pt wife would like to speak to provider about hospice care for pt. Wife would like this ASAP    Date of last appointment with provider: 8/11/22    Could we send this information to you in [employer] or would you prefer to receive a phone call?:   Patient would prefer a phone call   Okay to leave a detailed message?: Yes at Home number on file [phone] (home)      Brianda Mann  Patient      
"Dr. Bustos:    Patient's wife was called, she is very upset as patient is telling wife Casandra that he is ready to die, Casandra says patient is very ill and wants to have hospice started \"TODAY.\"  Casandra insists that PCP speak to her directly at [phone],  referral is pended, please advise.      Puja RN    "
Dr. Bustos:      Called the [facility] number 5 times and each time was busy. Casandra, the wife is updated that the referral is placed, she is also provided with their number to call as well. Casandra is now asking if you would prescribe some morphine for the patient as the Vicodin and oxycodone only dull the pain but the patient is still not comfortable, pharmacy is pended.    Patient also asks if you would please call her once you return on 8-17-22.    Please advise.      JOSE ALBERTO Luo    
Has this patient/wife been called again?  See my note from 5 hours ago.    Jenny Bustos M.D.    
Is he taking just one oxycodone 5 mg?  Has he tried two at a time?    If this isn't effective we could go to Dilaudid.      Have they heard from Hospice yet?    Jenny Bustos M.D.      
Noted, thanks,    Jenny Bustos M.D.    
Referral is placed.    Please notify his wife that I am out of the office until 8/17/2022 and can contact her at that time if she would like me to (route back to me please).    Please notify [facility], if they are unable to take him, we should attempt to find another hospice group who has availability.    Jenny Bustos M.D.      
Writer called patient's wife (Casandra).  Casandra Bustos for review.    Thomas ABRAMS [facility]    
Never smoker

## 2022-10-11 NOTE — ED ADULT TRIAGE NOTE - SOURCE OF INFORMATION
[facility]  MA Rooming Progress Note    1. Are you taking medication as instructed? Yes  2. Do you have any medication questions/concerns since your last visit?  No    3. Did the patient bring a friend or family member with them into the room? No  4. If so, did the patient give explicit permission for the practitioner to discuss their healthcare in front of that friend/family member? N/A       Patient

## 2023-03-13 NOTE — DISCHARGE NOTE OB - PROVIDER TOKENS
Pt has visitors in room acting suspicious. Went in to check on patient and he was in the bathroom with his brother. When asked what they were doing he said nothing. Received call from RN Zuleika who stated the patients visitor was rude and aggressive towards her. This writer reminded the patient of the visiting hours. Patients brother came to the nurses station talking in an aggressive manor stating that he hates this hospital and his brother is not receiving good care and as the POA he is checking him out now. He was at the nurses station using explicit language. Security was called to escort the visitor off the unit and he began to yell and curse at the security officers. The patient with the help of security were able to walk the visitor downstairs. The patient verbalizes that he would like to stay and is not requesting to sign out AMA. He is requesting that we allow his \"aunt\" to stay but we reminded the patient again of the visitor policy. The patient and his other visitor were compliant with the request for all visitors to leave due to hospital policy.  
Pt. Received and reviewed AVS pt. Verbalized understanding ofo post hospitalization care, meds and SE per teach back method, iv removed, pt. Walked down to the main entrance to be transferred via family member   
Received patient from ED due to complaints of chest pain. Pt received on telemetry. d5 and insulin infusion started. Pt educated on hourly accuchecks. Plan of care reviewed and pt voices understanding.  
bs 61 patient complaining of palpitations and sweating. Upon inspection, patient is diaphoretic. Orange juice given. Insulin drip discontinued per order and resident messaged to inform.  
TOKKRISTAL:'[id number]'

## 2023-07-05 ENCOUNTER — EMERGENCY (EMERGENCY)
Facility: HOSPITAL | Age: 33
LOS: 1 days | Discharge: HOME | End: 2023-07-05
Attending: EMERGENCY MEDICINE | Admitting: EMERGENCY MEDICINE
Payer: MEDICAID

## 2023-07-05 VITALS
HEIGHT: 65 IN | HEART RATE: 85 BPM | OXYGEN SATURATION: 98 % | SYSTOLIC BLOOD PRESSURE: 103 MMHG | TEMPERATURE: 97 F | WEIGHT: 154.98 LBS | DIASTOLIC BLOOD PRESSURE: 70 MMHG | RESPIRATION RATE: 16 BRPM

## 2023-07-05 VITALS — SYSTOLIC BLOOD PRESSURE: 97 MMHG | DIASTOLIC BLOOD PRESSURE: 64 MMHG | RESPIRATION RATE: 16 BRPM | HEART RATE: 82 BPM

## 2023-07-05 LAB
ALBUMIN SERPL ELPH-MCNC: 3.5 G/DL — SIGNIFICANT CHANGE UP (ref 3.3–5)
ALP SERPL-CCNC: 76 U/L — SIGNIFICANT CHANGE UP (ref 40–120)
ALT FLD-CCNC: 28 U/L — SIGNIFICANT CHANGE UP (ref 12–78)
ANION GAP SERPL CALC-SCNC: 6 MMOL/L — SIGNIFICANT CHANGE UP (ref 5–17)
APPEARANCE UR: CLEAR — SIGNIFICANT CHANGE UP
AST SERPL-CCNC: 18 U/L — SIGNIFICANT CHANGE UP (ref 15–37)
BASOPHILS # BLD AUTO: 0.05 K/UL — SIGNIFICANT CHANGE UP (ref 0–0.2)
BASOPHILS NFR BLD AUTO: 0.4 % — SIGNIFICANT CHANGE UP (ref 0–2)
BILIRUB SERPL-MCNC: 0.2 MG/DL — SIGNIFICANT CHANGE UP (ref 0.2–1.2)
BILIRUB UR-MCNC: NEGATIVE — SIGNIFICANT CHANGE UP
BUN SERPL-MCNC: 9 MG/DL — SIGNIFICANT CHANGE UP (ref 7–23)
CALCIUM SERPL-MCNC: 8.7 MG/DL — SIGNIFICANT CHANGE UP (ref 8.5–10.1)
CHLORIDE SERPL-SCNC: 109 MMOL/L — HIGH (ref 96–108)
CK MB CFR SERPL CALC: <1 NG/ML — SIGNIFICANT CHANGE UP (ref 0–3.6)
CO2 SERPL-SCNC: 26 MMOL/L — SIGNIFICANT CHANGE UP (ref 22–31)
COLOR SPEC: YELLOW — SIGNIFICANT CHANGE UP
CREAT SERPL-MCNC: 0.5 MG/DL — SIGNIFICANT CHANGE UP (ref 0.5–1.3)
DIFF PNL FLD: NEGATIVE — SIGNIFICANT CHANGE UP
EGFR: 127 ML/MIN/1.73M2 — SIGNIFICANT CHANGE UP
EOSINOPHIL # BLD AUTO: 0.52 K/UL — HIGH (ref 0–0.5)
EOSINOPHIL NFR BLD AUTO: 4.6 % — SIGNIFICANT CHANGE UP (ref 0–6)
GLUCOSE SERPL-MCNC: 87 MG/DL — SIGNIFICANT CHANGE UP (ref 70–99)
GLUCOSE UR QL: NEGATIVE MG/DL — SIGNIFICANT CHANGE UP
HCG SERPL-ACNC: <1 MIU/ML — SIGNIFICANT CHANGE UP
HCT VFR BLD CALC: 38.4 % — SIGNIFICANT CHANGE UP (ref 34.5–45)
HGB BLD-MCNC: 13.3 G/DL — SIGNIFICANT CHANGE UP (ref 11.5–15.5)
IMM GRANULOCYTES NFR BLD AUTO: 0.5 % — SIGNIFICANT CHANGE UP (ref 0–0.9)
KETONES UR-MCNC: NEGATIVE MG/DL — SIGNIFICANT CHANGE UP
LEUKOCYTE ESTERASE UR-ACNC: NEGATIVE — SIGNIFICANT CHANGE UP
LIDOCAIN IGE QN: 86 U/L — SIGNIFICANT CHANGE UP (ref 73–393)
LYMPHOCYTES # BLD AUTO: 2.58 K/UL — SIGNIFICANT CHANGE UP (ref 1–3.3)
LYMPHOCYTES # BLD AUTO: 22.8 % — SIGNIFICANT CHANGE UP (ref 13–44)
MCHC RBC-ENTMCNC: 28 PG — SIGNIFICANT CHANGE UP (ref 27–34)
MCHC RBC-ENTMCNC: 34.6 GM/DL — SIGNIFICANT CHANGE UP (ref 32–36)
MCV RBC AUTO: 80.8 FL — SIGNIFICANT CHANGE UP (ref 80–100)
MONOCYTES # BLD AUTO: 1.22 K/UL — HIGH (ref 0–0.9)
MONOCYTES NFR BLD AUTO: 10.8 % — SIGNIFICANT CHANGE UP (ref 2–14)
NEUTROPHILS # BLD AUTO: 6.89 K/UL — SIGNIFICANT CHANGE UP (ref 1.8–7.4)
NEUTROPHILS NFR BLD AUTO: 60.9 % — SIGNIFICANT CHANGE UP (ref 43–77)
NITRITE UR-MCNC: NEGATIVE — SIGNIFICANT CHANGE UP
NRBC # BLD: 0 /100 WBCS — SIGNIFICANT CHANGE UP (ref 0–0)
PH UR: 6 — SIGNIFICANT CHANGE UP (ref 5–8)
PLATELET # BLD AUTO: 265 K/UL — SIGNIFICANT CHANGE UP (ref 150–400)
POTASSIUM SERPL-MCNC: 3.7 MMOL/L — SIGNIFICANT CHANGE UP (ref 3.5–5.3)
POTASSIUM SERPL-SCNC: 3.7 MMOL/L — SIGNIFICANT CHANGE UP (ref 3.5–5.3)
PROT SERPL-MCNC: 7.2 G/DL — SIGNIFICANT CHANGE UP (ref 6–8.3)
PROT UR-MCNC: NEGATIVE MG/DL — SIGNIFICANT CHANGE UP
RBC # BLD: 4.75 M/UL — SIGNIFICANT CHANGE UP (ref 3.8–5.2)
RBC # FLD: 15 % — HIGH (ref 10.3–14.5)
SODIUM SERPL-SCNC: 141 MMOL/L — SIGNIFICANT CHANGE UP (ref 135–145)
SP GR SPEC: 1.02 — SIGNIFICANT CHANGE UP (ref 1–1.03)
TROPONIN I, HIGH SENSITIVITY RESULT: <3 NG/L — SIGNIFICANT CHANGE UP
UROBILINOGEN FLD QL: 0.2 MG/DL — SIGNIFICANT CHANGE UP (ref 0.2–1)
WBC # BLD: 11.32 K/UL — HIGH (ref 3.8–10.5)
WBC # FLD AUTO: 11.32 K/UL — HIGH (ref 3.8–10.5)

## 2023-07-05 PROCEDURE — 74177 CT ABD & PELVIS W/CONTRAST: CPT | Mod: MA

## 2023-07-05 PROCEDURE — 76705 ECHO EXAM OF ABDOMEN: CPT

## 2023-07-05 PROCEDURE — 87086 URINE CULTURE/COLONY COUNT: CPT

## 2023-07-05 PROCEDURE — 99285 EMERGENCY DEPT VISIT HI MDM: CPT

## 2023-07-05 PROCEDURE — 81003 URINALYSIS AUTO W/O SCOPE: CPT

## 2023-07-05 PROCEDURE — 36415 COLL VENOUS BLD VENIPUNCTURE: CPT

## 2023-07-05 PROCEDURE — 93010 ELECTROCARDIOGRAM REPORT: CPT

## 2023-07-05 PROCEDURE — 80053 COMPREHEN METABOLIC PANEL: CPT

## 2023-07-05 PROCEDURE — 83690 ASSAY OF LIPASE: CPT

## 2023-07-05 PROCEDURE — 84484 ASSAY OF TROPONIN QUANT: CPT

## 2023-07-05 PROCEDURE — 76705 ECHO EXAM OF ABDOMEN: CPT | Mod: 26

## 2023-07-05 PROCEDURE — 84702 CHORIONIC GONADOTROPIN TEST: CPT

## 2023-07-05 PROCEDURE — 85025 COMPLETE CBC W/AUTO DIFF WBC: CPT

## 2023-07-05 PROCEDURE — 96375 TX/PRO/DX INJ NEW DRUG ADDON: CPT

## 2023-07-05 PROCEDURE — 99285 EMERGENCY DEPT VISIT HI MDM: CPT | Mod: 25

## 2023-07-05 PROCEDURE — 93005 ELECTROCARDIOGRAM TRACING: CPT

## 2023-07-05 PROCEDURE — 82553 CREATINE MB FRACTION: CPT

## 2023-07-05 PROCEDURE — 74177 CT ABD & PELVIS W/CONTRAST: CPT | Mod: 26,MA

## 2023-07-05 PROCEDURE — 96374 THER/PROPH/DIAG INJ IV PUSH: CPT | Mod: XU

## 2023-07-05 PROCEDURE — 99284 EMERGENCY DEPT VISIT MOD MDM: CPT | Mod: 25

## 2023-07-05 RX ORDER — KETOROLAC TROMETHAMINE 30 MG/ML
15 SYRINGE (ML) INJECTION ONCE
Refills: 0 | Status: COMPLETED | OUTPATIENT
Start: 2023-07-05 | End: 2023-07-05

## 2023-07-05 RX ORDER — SODIUM CHLORIDE 9 MG/ML
1000 INJECTION INTRAMUSCULAR; INTRAVENOUS; SUBCUTANEOUS ONCE
Refills: 0 | Status: COMPLETED | OUTPATIENT
Start: 2023-07-05 | End: 2023-07-05

## 2023-07-05 RX ORDER — ACETAMINOPHEN 500 MG
1000 TABLET ORAL ONCE
Refills: 0 | Status: COMPLETED | OUTPATIENT
Start: 2023-07-05 | End: 2023-07-05

## 2023-07-05 RX ORDER — FAMOTIDINE 10 MG/ML
20 INJECTION INTRAVENOUS ONCE
Refills: 0 | Status: COMPLETED | OUTPATIENT
Start: 2023-07-05 | End: 2023-07-05

## 2023-07-05 RX ORDER — ONDANSETRON 8 MG/1
4 TABLET, FILM COATED ORAL ONCE
Refills: 0 | Status: COMPLETED | OUTPATIENT
Start: 2023-07-05 | End: 2023-07-05

## 2023-07-05 RX ADMIN — SODIUM CHLORIDE 1000 MILLILITER(S): 9 INJECTION INTRAMUSCULAR; INTRAVENOUS; SUBCUTANEOUS at 07:02

## 2023-07-05 RX ADMIN — Medication 400 MILLIGRAM(S): at 07:02

## 2023-07-05 RX ADMIN — ONDANSETRON 4 MILLIGRAM(S): 8 TABLET, FILM COATED ORAL at 07:02

## 2023-07-05 RX ADMIN — FAMOTIDINE 20 MILLIGRAM(S): 10 INJECTION INTRAVENOUS at 07:02

## 2023-07-05 NOTE — ED ADULT TRIAGE NOTE - CHIEF COMPLAINT QUOTE
Pt has epigastric abd pain since last night with nausea and vomited 1x.. diarrhea x1 week no recent antibiotic use.

## 2023-07-05 NOTE — ED PROVIDER NOTE - PATIENT PORTAL LINK FT
You can access the FollowMyHealth Patient Portal offered by [facility] by registering at the following website: [email or web address]. By joining [employer]’s FollowMyHealth portal, you will also be able to view your health information using other applications (apps) compatible with our system.

## 2023-07-05 NOTE — ED PROVIDER NOTE - NSFOLLOWUPINSTRUCTIONS_ED_ALL_ED_FT
-- You should update your primary care physician on your Emergency Department visit and follow up with them.  If you do not have a physician or have difficulty following up, please call: [phone] to obtain a [facility] doctor or specialist who can provide follow up.    -- Start Pepto-Bismol (over the counter), take as directed    -- Return to the ER for worsening or persistent symptoms, and/or ANY NEW OR CONCERNING SYMPTOMS.

## 2023-07-05 NOTE — ED PROVIDER NOTE - BIRTH SEX
[facility] [phone]   Female

## 2023-07-05 NOTE — ED ADULT NURSE NOTE - OBJECTIVE STATEMENT
33 y/o female BIBA. Alert and oriented x4. C/o abdominal pain and multiple episodes of diarrhea. Denies any cp, sob, n/v/d, dizziness, headache, fever/chills. Respirations even and unlabored.

## 2023-07-05 NOTE — ED PROVIDER NOTE - CLINICAL SUMMARY MEDICAL DECISION MAKING FREE TEXT BOX
32 female with epigastric pain, follow-up CBC, CMP, hCG, lipase, CT abdomen and pelvis, ultrasound gallbladder, IV fluids, antiemetics, pain control and reevaluate. 32 female with epigastric pain, follow-up CBC, CMP, hCG, lipase, CT abdomen and pelvis, ultrasound gallbladder, IV fluids, antiemetics, pain control and reevaluate.    Ayo: pt states that she is feeling better, ED work up unremarkable for any acute intraabdominal pathology, sx likely 2/2 to viral gastro

## 2023-07-05 NOTE — ED PROVIDER NOTE - OBJECTIVE STATEMENT
32-year-old female with no significant past medical history presents to the emergency department by ambulance with report of abdominal pain.  Patient states that for the last week she has had diarrhea, 2 days ago she felt that she had a fever but did not take her temperature, and last night developed epigastric pain associated with nausea, worsening this morning and came to the emergency department.  Patient denies any vaginal bleeding, no dysuria.

## 2023-07-06 LAB
CULTURE RESULTS: SIGNIFICANT CHANGE UP
SPECIMEN SOURCE: SIGNIFICANT CHANGE UP

## 2023-10-25 NOTE — OB RN TRIAGE NOTE - NSNURSINGINSTR_OBGYN_ALL_OB_FT
pt evaluated for labor, no evidence of active labor, pt d/c to home with instructions given by haylee. Cephalexin Counseling: I counseled the patient regarding use of cephalexin as an antibiotic for prophylactic and/or therapeutic purposes. Cephalexin (commonly prescribed under brand name Keflex) is a cephalosporin antibiotic which is active against numerous classes of bacteria, including most skin bacteria. Side effects may include nausea, diarrhea, gastrointestinal upset, rash, hives, yeast infections, and in rare cases, hepatitis, kidney disease, seizures, fever, confusion, neurologic symptoms, and others. Patients with severe allergies to penicillin medications are cautioned that there is about a 10% incidence of cross-reactivity with cephalosporins. When possible, patients with penicillin allergies should use alternatives to cephalosporins for antibiotic therapy.

## 2023-12-13 NOTE — OB RN DELIVERY SUMMARY - BABYS CARE PROVIDER NAME, OB PROFILE
Patient known to the practice he is previously seen by Dr. Justice and then Dr. Mitchell.  He has a history of alcohol use disorder and endoscopically diagnosed esophageal ulcer currently on PPI which she is compliant with.  Labs from October 4, 2023 reviewedHemoglobin is 16.4 with a hematocrit of 51.7Platelet count is 216CMP is normal with albumin of 4.4Alkaline phosphatase is 38 and AST is 46 ALT 36Total cholesterol 220 with LDL of 152PSA is 1.1Hepatitis B antibody is negativeHemoglobin A1c is 5.6 which is improved from 5.7 in 2019Labs from 5/26/2023 reveals an alkaline phosphatase of 40 AST of 33 and ALT of 34  Patient reports pain in the abdomen which prompted his visit to GI office
Pepe

## 2024-08-06 ENCOUNTER — APPOINTMENT (OUTPATIENT)
Dept: ANTEPARTUM | Facility: CLINIC | Age: 34
End: 2024-08-06

## 2024-08-07 ENCOUNTER — OUTPATIENT (OUTPATIENT)
Dept: INPATIENT UNIT | Facility: HOSPITAL | Age: 34
LOS: 1 days | Discharge: HOME | End: 2024-08-07
Payer: MEDICAID

## 2024-08-07 VITALS
SYSTOLIC BLOOD PRESSURE: 111 MMHG | HEART RATE: 100 BPM | DIASTOLIC BLOOD PRESSURE: 79 MMHG | TEMPERATURE: 98 F | RESPIRATION RATE: 16 BRPM

## 2024-08-07 VITALS — DIASTOLIC BLOOD PRESSURE: 66 MMHG | SYSTOLIC BLOOD PRESSURE: 110 MMHG | HEART RATE: 88 BPM

## 2024-08-07 DIAGNOSIS — O26.899 OTHER SPECIFIED PREGNANCY RELATED CONDITIONS, UNSPECIFIED TRIMESTER: ICD-10-CM

## 2024-08-07 DIAGNOSIS — Z98.890 OTHER SPECIFIED POSTPROCEDURAL STATES: Chronic | ICD-10-CM

## 2024-08-07 LAB
APPEARANCE UR: ABNORMAL
BACTERIA # UR AUTO: NEGATIVE /HPF — SIGNIFICANT CHANGE UP
BILIRUB UR-MCNC: NEGATIVE — SIGNIFICANT CHANGE UP
CAST: 0 /LPF — SIGNIFICANT CHANGE UP (ref 0–4)
COLOR SPEC: YELLOW — SIGNIFICANT CHANGE UP
DIFF PNL FLD: NEGATIVE — SIGNIFICANT CHANGE UP
FLUAV AG NPH QL: SIGNIFICANT CHANGE UP
FLUBV AG NPH QL: SIGNIFICANT CHANGE UP
GLUCOSE UR QL: NEGATIVE MG/DL — SIGNIFICANT CHANGE UP
KETONES UR-MCNC: NEGATIVE MG/DL — SIGNIFICANT CHANGE UP
LEUKOCYTE ESTERASE UR-ACNC: NEGATIVE — SIGNIFICANT CHANGE UP
NITRITE UR-MCNC: NEGATIVE — SIGNIFICANT CHANGE UP
PH UR: 6.5 — SIGNIFICANT CHANGE UP (ref 5–8)
PROT UR-MCNC: NEGATIVE MG/DL — SIGNIFICANT CHANGE UP
RBC CASTS # UR COMP ASSIST: 0 /HPF — SIGNIFICANT CHANGE UP (ref 0–4)
REVIEW: SIGNIFICANT CHANGE UP
RSV RNA NPH QL NAA+NON-PROBE: SIGNIFICANT CHANGE UP
SARS-COV-2 RNA SPEC QL NAA+PROBE: SIGNIFICANT CHANGE UP
SP GR SPEC: 1.02 — SIGNIFICANT CHANGE UP (ref 1–1.03)
SQUAMOUS # UR AUTO: 2 /HPF — SIGNIFICANT CHANGE UP (ref 0–5)
UROBILINOGEN FLD QL: 1 MG/DL — SIGNIFICANT CHANGE UP (ref 0.2–1)
WBC UR QL: 1 /HPF — SIGNIFICANT CHANGE UP (ref 0–5)

## 2024-08-07 PROCEDURE — 59025 FETAL NON-STRESS TEST: CPT | Mod: 26

## 2024-08-07 PROCEDURE — 99221 1ST HOSP IP/OBS SF/LOW 40: CPT | Mod: 25

## 2024-08-07 RX ORDER — DEXTROSE MONOHYDRATE, SODIUM CHLORIDE, SODIUM LACTATE, CALCIUM CHLORIDE, MAGNESIUM CHLORIDE 1.5; 538; 448; 18.4; 5.08 G/100ML; MG/100ML; MG/100ML; MG/100ML; MG/100ML
1000 SOLUTION INTRAPERITONEAL ONCE
Refills: 0 | Status: COMPLETED | OUTPATIENT
Start: 2024-08-07 | End: 2024-08-07

## 2024-08-07 RX ADMIN — DEXTROSE MONOHYDRATE, SODIUM CHLORIDE, SODIUM LACTATE, CALCIUM CHLORIDE, MAGNESIUM CHLORIDE 1000 MILLILITER(S): 1.5; 538; 448; 18.4; 5.08 SOLUTION INTRAPERITONEAL at 04:54

## 2024-08-07 NOTE — OB PROVIDER TRIAGE NOTE - HISTORY OF PRESENT ILLNESS
34 y/o  at 27w2d GA, JONATHAN 24, presenting with c/o cough x2 days and pelvic cramping and spotting x12 hrs.  Patient reports intermittent pink-tinged fluid when wiping after urination, states that spotting has not been continuous.  Pelvic pain radiates to back, L side worse than R side, rates pain to be 3 out of 10 in severity.  Reports recent intercourse 3 days ago.  Patient states cough is dry and has been improving since symptom onset.  Patient has not taken any OTC meds for symptomatic relief.  Endorses good fetal movement. Denies: fever, sick contacts, recent strenuous activity, CP, SOB, VB soaking through pads, leakage of fluid, ctx.    Primary OB care with [facility]  Uncomplicated pregnancy course  Patient reports h/o PTD x2 (, )  EFW based on Leopold's: 1000g   32 y/o  at 27w2d GA, JONATHAN 24, presenting with c/o cough x2 days and pelvic cramping and spotting x12 hrs.  Patient reports intermittent pink-tinged fluid when wiping after urination, states that spotting has not been continuous.  Pelvic pain radiates to back, L side worse than R side, rates pain to be 3 out of 10 in severity.  Reports recent intercourse 3 days ago.  Patient states cough is dry and associated with nasal discharge, states has been improving since symptom onset.  Patient has not taken any OTC meds for symptomatic relief.  Endorses good fetal movement. Denies: fever, sick contacts, recent strenuous activity, CP, SOB, VB soaking through pads, leakage of fluid, ctx.    Primary OB care with [facility]  Uncomplicated pregnancy course  Patient reports h/o PTD x2 (, )  EFW based on Leopold's: 1000g

## 2024-08-07 NOTE — OB PROVIDER TRIAGE NOTE - NSHPPHYSICALEXAM_GEN_ALL_CORE
ICU Vital Signs Last 24 Hrs  T(C): 36.8 (07 Aug 2024 02:21), Max: 36.8 (07 Aug 2024 01:37)  T(F): 98.2 (07 Aug 2024 02:21), Max: 98.3 (07 Aug 2024 01:37)  HR: 100 (07 Aug 2024 03:54) (94 - 100)  BP: 112/72 (07 Aug 2024 03:54) (111/79 - 131/80)  RR: 16 (07 Aug 2024 02:21) (16 - 16)  SpO2: 99% (07 Aug 2024 01:37) (99% - 99%)    General: Patient sitting comfortably in bed, A&Ox3  Heart: RRR  Lungs: Clear bilateral to auscultation, good inspiratory/expiratory effort  Abd: soft, non-tender, gravid, TOCO in place, +Pfannelstein scar/hypertrophied  Bedside Transabdominal Sonogram: ** presentation, **placenta, +FH noted, M mode ** BPM, VERONA **, BPP **, images saved in ASOB  EFM: BPM baseline, moderate variability, +accels, - decels, category I tracing  [place]: Irregular contractions  SSE:  SVE: _/_/  Ext:  FROM bilateral no edema/+1 edema  Neuro: grossly intact ICU Vital Signs Last 24 Hrs  T(C): 36.8 (07 Aug 2024 02:21), Max: 36.8 (07 Aug 2024 01:37)  T(F): 98.2 (07 Aug 2024 02:21), Max: 98.3 (07 Aug 2024 01:37)  HR: 100 (07 Aug 2024 03:54) (94 - 100)  BP: 112/72 (07 Aug 2024 03:54) (111/79 - 131/80)  RR: 16 (07 Aug 2024 02:21) (16 - 16)  SpO2: 99% (07 Aug 2024 01:37) (99% - 99%)    General: Patient sitting comfortably in bed, A&Ox3  Heart: RRR  Lungs: Clear bilateral to auscultation, no wheezing/rhonci/rales, good inspiratory/expiratory effort  Abd: soft, non-tender, gravid, TOCO in place  Bedside Transabdominal Sonogram: Vertex presentation, anterior placenta, +FH noted, M mode 126 BPM, MVP 6.13, images saved in ASOB  EFM: BPM baseline, moderate variability, +accels, - decels, category I tracing  [place]: Irregular contractions  SSE:  SVE: _/_/  Ext:  FROM bilateral no edema/+1 edema  Neuro: grossly intact ICU Vital Signs Last 24 Hrs  T(C): 36.8 (07 Aug 2024 02:21), Max: 36.8 (07 Aug 2024 01:37)  T(F): 98.2 (07 Aug 2024 02:21), Max: 98.3 (07 Aug 2024 01:37)  HR: 100 (07 Aug 2024 03:54) (94 - 100)  BP: 112/72 (07 Aug 2024 03:54) (111/79 - 131/80)  RR: 16 (07 Aug 2024 02:21) (16 - 16)  SpO2: 99% (07 Aug 2024 01:37) (99% - 99%)    General: Patient sitting comfortably in bed, A&Ox3  Heart: RRR  Lungs: Clear bilateral to auscultation, no wheezing/rhonci/rales, good inspiratory/expiratory effort  Abd: soft, non-tender, gravid, TOCO in place  Bedside Transabdominal Sonogram: Vertex presentation, anterior placenta, +FH noted, M mode 126 BPM, MVP 6.13, images saved in ASOB  TVUS: CL 3.13cm, no funneling  EFM: 130 BPM baseline, moderate variability, +10x10 accels, - decels, category I tracing, reactive  [place]: Uterine irritability/irregular ctx  SSE: Cervix closed, no active bleeding, physiologic leukorrhea noted  SVE: 0/0-3  Ext:  FROM bilateral no edema  Neuro: grossly intact ICU Vital Signs Last 24 Hrs  T(C): 36.8 (07 Aug 2024 02:21), Max: 36.8 (07 Aug 2024 01:37)  T(F): 98.2 (07 Aug 2024 02:21), Max: 98.3 (07 Aug 2024 01:37)  HR: 100 (07 Aug 2024 03:54) (94 - 100)  BP: 112/72 (07 Aug 2024 03:54) (111/79 - 131/80)  RR: 16 (07 Aug 2024 02:21) (16 - 16)  SpO2: 99% (07 Aug 2024 01:37) (99% - 99%)    General: Patient sitting comfortably in bed, A&Ox3  Heart: RRR  Lungs: Clear bilateral to auscultation, no wheezing/rhonci/rales, good inspiratory/expiratory effort  Abd: soft, non-tender, gravid, TOCO in place  Bedside Transabdominal Sonogram: Vertex presentation, anterior placenta, +FH noted, M mode 126 BPM, MVP 6.13, images saved in ASOB  TVUS: CL 3.13cm, no funneling  EFM: 130 BPM baseline, moderate variability, +10x10 accels, - decels, category I tracing, reactive  [place]: Uterine irritability/irregular ctx  SSE: Cervix closed, no active bleeding, physiologic leukorrhea noted  SVE: 0/0/-3  Ext:  FROM bilateral no edema  Neuro: grossly intact

## 2024-08-07 NOTE — OB RN TRIAGE NOTE - CHIEF COMPLAINT QUOTE
Cramping. lower back pain and spotting 3/10 pain scale . Cramping. lower back pain and spotting 3/10 pain scale . Cough x2 days .

## 2024-08-07 NOTE — OB PROVIDER TRIAGE NOTE - ADDITIONAL INSTRUCTIONS
- Patient to be discharged home with follow up and return precautions  - Please follow up with your obstetrician at your next scheduled appointment.   - Please return for decreased / no fetal movement, vaginal bleeding similar to that of a period, leaking / gush of fluid, regular contractions occurring 4-5 minutes  for one hour or requiring pain medication   - Patient educated of plan and demonstrates understanding. All questions answered. Discharge instructions provided and signed

## 2024-08-07 NOTE — OB PROVIDER TRIAGE NOTE - NSOBPROVIDERNOTE_OBGYN_ALL_OB_FT
A: 34 y/o  @ 37w2d GA evaluated for PTL. H/o PTD x2    P: Will monitor and tx accordingly     EFM/[place]     SVE 0/0/-3     CL 3.13cm     UA wnl     Covid/flu swab    @0442:     Irregular ctx noted on [place]     1L Bolus IVF    Case discussed with Dr Chely Szymanski, PA-C A: 32 y/o  @ 37w2d GA evaluated for PTL. H/o PTD x2    P: Will monitor and tx accordingly     EFM/[place]     SVE 0/0/-3     CL 3.13cm     UA wnl     Covid/flu/rsv swab sent    @0442:     Irregular ctx noted on [place]     1L Bolus IVF    Case discussed with Dr Chely Szymanski, PA-C A: 34 y/o  @ 37w2d GA evaluated for PTL. H/o PTD x2  P: Will monitor and tx accordingly     EFM/[place]     SVE 0/0/-3     CL 3.13cm     UA wnl     Covid/flu/rsv swab sent  @0442:     Irregular ctx noted on [place]     1L Bolus IVF  @0607:     Ctx subsided s/p IV fluids     Reassuring fetal/maternal status    Discharged @ 0608:  - Patient to be discharged home with follow up and return precautions  - Please follow up with your obstetrician at your next scheduled appointment.   - Please return for decreased / no fetal movement, vaginal bleeding similar to that of a period, leaking / gush of fluid, regular contractions occurring 4-5 minutes  for one hour or requiring pain medication   - Patient educated of plan and demonstrates understanding. All questions answered. Discharge instructions provided and signed    Case discussed with Dr Chely Szymanski PA-C

## 2024-08-07 NOTE — OB PROVIDER TRIAGE NOTE - NSHPLABSRESULTS_GEN_ALL_CORE
Urinalysis Basic - ( 07 Aug 2024 03:24 )  Color: Yellow / Appearance: Cloudy / S.018 / pH: x  Gluc: x / Ketone: Negative mg/dL  / Bili: Negative / Urobili: 1.0 mg/dL   Blood: x / Protein: Negative mg/dL / Nitrite: Negative   Leuk Esterase: Negative / RBC: 0 /HPF / WBC 1 /HPF   Sq Epi: x / Non Sq Epi: 2 /HPF / Bacteria: Negative /HPF

## 2024-08-07 NOTE — OB PROVIDER TRIAGE NOTE - NS_FHRACCEL_OBGYN_ALL_OB
Currently hypotensive with SBP<100 but baseline for patient.    - Metop tartrate 75mg TID Resolved. RLE more erythematous and warm compared to LLE with some mild tenderness to palpation. Received steroid and IVIG so started IV antibx. Not concerned for MRSA.    - received Cefazolin 1000 q8h for 5 days (end: 07/21)  - Improved on exam. CTM Close to baseline now. No drainage today    Plan:  - IV treatment per above  -> Wound care consult: conservative management with [facility] [address]  Observe discharge from left leg (currently no discharge) Present (10 x10 bpm)

## 2024-08-07 NOTE — OB PROVIDER TRIAGE NOTE - NS_OBGYNHISTORY_OBGYN_ALL_OB_FT
OB History:   -  @ 37w4d GA, 12, 6lb9oz male  - PT  @ 36w GA, 16  - PT  @ 36w GA, 20  - TOP x1 with D&C ()    GYN History: Denies history of endometriosis/uterine fibroids/cysts/abnormal pap smears/STIs.

## 2024-08-08 ENCOUNTER — OUTPATIENT (OUTPATIENT)
Dept: INPATIENT UNIT | Facility: HOSPITAL | Age: 34
LOS: 1 days | Discharge: HOME | End: 2024-08-08

## 2024-08-08 ENCOUNTER — APPOINTMENT (OUTPATIENT)
Dept: ANTEPARTUM | Facility: CLINIC | Age: 34
End: 2024-08-08

## 2024-08-08 VITALS — SYSTOLIC BLOOD PRESSURE: 115 MMHG | HEART RATE: 88 BPM | DIASTOLIC BLOOD PRESSURE: 58 MMHG

## 2024-08-08 VITALS
SYSTOLIC BLOOD PRESSURE: 121 MMHG | TEMPERATURE: 98 F | RESPIRATION RATE: 16 BRPM | DIASTOLIC BLOOD PRESSURE: 73 MMHG | HEART RATE: 76 BPM

## 2024-08-08 DIAGNOSIS — O26.899 OTHER SPECIFIED PREGNANCY RELATED CONDITIONS, UNSPECIFIED TRIMESTER: ICD-10-CM

## 2024-08-08 DIAGNOSIS — Z98.890 OTHER SPECIFIED POSTPROCEDURAL STATES: Chronic | ICD-10-CM

## 2024-08-08 LAB
APPEARANCE UR: ABNORMAL
BACTERIA # UR AUTO: NEGATIVE /HPF — SIGNIFICANT CHANGE UP
BILIRUB UR-MCNC: NEGATIVE — SIGNIFICANT CHANGE UP
CAST: 0 /LPF — SIGNIFICANT CHANGE UP (ref 0–4)
COLOR SPEC: YELLOW — SIGNIFICANT CHANGE UP
DIFF PNL FLD: NEGATIVE — SIGNIFICANT CHANGE UP
GLUCOSE UR QL: NEGATIVE MG/DL — SIGNIFICANT CHANGE UP
KETONES UR-MCNC: NEGATIVE MG/DL — SIGNIFICANT CHANGE UP
LEUKOCYTE ESTERASE UR-ACNC: ABNORMAL
NITRITE UR-MCNC: NEGATIVE — SIGNIFICANT CHANGE UP
PH UR: 7 — SIGNIFICANT CHANGE UP (ref 5–8)
PROT UR-MCNC: NEGATIVE MG/DL — SIGNIFICANT CHANGE UP
RBC CASTS # UR COMP ASSIST: 0 /HPF — SIGNIFICANT CHANGE UP (ref 0–4)
SP GR SPEC: 1.01 — SIGNIFICANT CHANGE UP (ref 1–1.03)
SQUAMOUS # UR AUTO: 1 /HPF — SIGNIFICANT CHANGE UP (ref 0–5)
UROBILINOGEN FLD QL: 0.2 MG/DL — SIGNIFICANT CHANGE UP (ref 0.2–1)
WBC UR QL: 1 /HPF — SIGNIFICANT CHANGE UP (ref 0–5)

## 2024-08-08 NOTE — OB PROVIDER TRIAGE NOTE - NSHPPHYSICALEXAM_GEN_ALL_CORE
T(C): 36.8 (08-08-24 @ 16:11), Max: 36.8 (08-08-24 @ 15:54)  HR: 88 (08-08-24 @ 18:45) (76 - 109)  BP: 115/58 (08-08-24 @ 18:45) (105/60 - 121/73)  RR: 16 (08-08-24 @ 16:11) (16 - 16)  SpO2: --    Gen: NAD, well-appearing, AAOx3   Abd: Soft, gravid  Ext: non-tender, non-edematous  SSE: cervix visually appears closed and long, no bleeding noted, small physiologic appearing vaginal discharge  SVE:  0.5/0/-3  Bedside sono: breech, anterior placenta, BPP 8/8, MVP 10,   Bedside TVUS: CL 2.5cm with funneling - no dynamic changes with suprapubic pressure  FHT: 135bpm, mod, +accels, -decels,   [place]: irritability    Exam performed with ADAM Beal PGY4

## 2024-08-08 NOTE — OB PROVIDER TRIAGE NOTE - ATTENDING COMMENTS
OB Attending Note    Agree w/ above.  Patient sent in for incidentally found short cervix.  noted to be 2.5 here in triage with closed cervix.   No evidence of PTL.   For close outpatient follow up.     BRIAN Stover MD

## 2024-08-08 NOTE — OB PROVIDER TRIAGE NOTE - HISTORY OF PRESENT ILLNESS
33y  at 27w2d GA who was sent to L&D from her outpatient OBGYN office for shortened cervical length on outpatient sono.  Patient was seen in triage  (yesterday) with complaint of pelvic cramping and spotting.  Patient states she has felt abdominal "tightening" without pain for 3 days and saw light pink spotting yesterday only when she wiped.  Cervical length on triage sono yesterday was 3.13cm.  She was discharged home to follow up with her OBGYN outpatient today.  At office today, her cervix was measured at 1.9cm, so she was sent in to triage for evaluation of cervical length.  Tonight, patient continues to endorse intermittent abdominal "tightening" without pain, but denies vaginal bleeding and leakage of fluid.  Endorses good fetal movement.  Endorses cough and runny nose for the past 2-3 days.  Denies fevers, chills, nausea, vomiting, chest pain, SOB, dizziness and headache. No other complaints at this time.     JONATHAN: 23    Prenatal course uncomplicated.      Primary OBGYN: [facility]  Obhx:  - : 38w  - 6#5  - 2017: 36w  due to PTL - 6#8  - : 37w  - 6#9  Gynhx: Denies fibroids, ovarian cysts, STIs, abnormal PAPs   PMH: Denies, including HTN, DM, asthma  PSH: Denies  Soc hx: Denies alcohol, tobacco and illicit drug use during this pregnancy  Anx/dep:  Denies  Meds: PNV  Allergies: NKDA      Will accept blood transfusion

## 2024-08-08 NOTE — OB PROVIDER TRIAGE NOTE - NSOBPROVIDERNOTE_OBGYN_ALL_OB_FT
33y  at 27w2d GA who was sent to L&D from her outpatient OBGYN office for shortened cervical length on outpatient sono.  CL in triage yesterday 3.13cm, outpatient was found to have CL 1.9cm.  In triage this evening, patient with CL 2.5cm with funneling.  CL today similar to reading from yesterday in triage.  Patient with abdominal tightening but no contractions.  No further vaginal spotting since yesterday.  Cervix long on digital exam.  No evidence of  labor or cervical shortning  - No role for BMZ at this time  - Limited concern for PTL, as cervix long on digital exam and ultrasound  - Patient to follow up outpatient with her OBGYN as scheduled  - Return precautions discussed with patient including decreased fetal movement, contractions, LOF, VB    Patient seen and evaluated with ADAM Beal PGY4  Discussed with Dr. Stover, Service Attending and Dr. Guzman, M Fellow  JAMIL Ron PGY3

## 2024-08-09 DIAGNOSIS — O26.852 SPOTTING COMPLICATING PREGNANCY, SECOND TRIMESTER: ICD-10-CM

## 2024-08-09 DIAGNOSIS — Z3A.27 27 WEEKS GESTATION OF PREGNANCY: ICD-10-CM

## 2024-08-09 DIAGNOSIS — R10.2 PELVIC AND PERINEAL PAIN: ICD-10-CM

## 2024-08-09 DIAGNOSIS — O26.892 OTHER SPECIFIED PREGNANCY RELATED CONDITIONS, SECOND TRIMESTER: ICD-10-CM

## 2024-08-09 DIAGNOSIS — O09.212 SUPERVISION OF PREGNANCY WITH HISTORY OF PRE-TERM LABOR, SECOND TRIMESTER: ICD-10-CM

## 2024-08-09 DIAGNOSIS — R05.9 COUGH, UNSPECIFIED: ICD-10-CM

## 2024-08-09 LAB
C TRACH RRNA SPEC QL NAA+PROBE: SIGNIFICANT CHANGE UP
N GONORRHOEA RRNA SPEC QL NAA+PROBE: SIGNIFICANT CHANGE UP
SPECIMEN SOURCE: SIGNIFICANT CHANGE UP
T VAGINALIS RRNA SPEC QL NAA+PROBE: SIGNIFICANT CHANGE UP

## 2024-08-10 LAB
CULTURE RESULTS: SIGNIFICANT CHANGE UP
SPECIMEN SOURCE: SIGNIFICANT CHANGE UP

## 2024-08-12 DIAGNOSIS — Z3A.27 27 WEEKS GESTATION OF PREGNANCY: ICD-10-CM

## 2024-08-12 DIAGNOSIS — O26.852 SPOTTING COMPLICATING PREGNANCY, SECOND TRIMESTER: ICD-10-CM

## 2024-08-12 DIAGNOSIS — O26.872 CERVICAL SHORTENING, SECOND TRIMESTER: ICD-10-CM

## 2024-08-12 DIAGNOSIS — O26.892 OTHER SPECIFIED PREGNANCY RELATED CONDITIONS, SECOND TRIMESTER: ICD-10-CM

## 2024-08-12 DIAGNOSIS — R10.2 PELVIC AND PERINEAL PAIN: ICD-10-CM

## 2024-08-12 DIAGNOSIS — O09.212 SUPERVISION OF PREGNANCY WITH HISTORY OF PRE-TERM LABOR, SECOND TRIMESTER: ICD-10-CM

## 2024-10-10 ENCOUNTER — APPOINTMENT (OUTPATIENT)
Dept: ANTEPARTUM | Facility: CLINIC | Age: 34
End: 2024-10-10

## 2024-10-10 ENCOUNTER — OUTPATIENT (OUTPATIENT)
Dept: OUTPATIENT SERVICES | Facility: HOSPITAL | Age: 34
LOS: 1 days | Discharge: HOME | End: 2024-10-10

## 2024-10-10 VITALS
SYSTOLIC BLOOD PRESSURE: 113 MMHG | DIASTOLIC BLOOD PRESSURE: 72 MMHG | HEART RATE: 88 BPM | RESPIRATION RATE: 16 BRPM | TEMPERATURE: 98 F

## 2024-10-10 DIAGNOSIS — Z90.89 ACQUIRED ABSENCE OF OTHER ORGANS: Chronic | ICD-10-CM

## 2024-10-10 DIAGNOSIS — O26.899 OTHER SPECIFIED PREGNANCY RELATED CONDITIONS, UNSPECIFIED TRIMESTER: ICD-10-CM

## 2024-10-10 DIAGNOSIS — Z98.890 OTHER SPECIFIED POSTPROCEDURAL STATES: Chronic | ICD-10-CM

## 2024-10-10 LAB
APPEARANCE UR: ABNORMAL
BILIRUB UR-MCNC: NEGATIVE — SIGNIFICANT CHANGE UP
COLOR SPEC: YELLOW — SIGNIFICANT CHANGE UP
DIFF PNL FLD: NEGATIVE — SIGNIFICANT CHANGE UP
GLUCOSE UR QL: NEGATIVE MG/DL — SIGNIFICANT CHANGE UP
KETONES UR-MCNC: NEGATIVE MG/DL — SIGNIFICANT CHANGE UP
LEUKOCYTE ESTERASE UR-ACNC: ABNORMAL
NITRITE UR-MCNC: NEGATIVE — SIGNIFICANT CHANGE UP
PH UR: 6.5 — SIGNIFICANT CHANGE UP (ref 5–8)
PROT UR-MCNC: NEGATIVE MG/DL — SIGNIFICANT CHANGE UP
SP GR SPEC: 1.01 — SIGNIFICANT CHANGE UP (ref 1–1.03)
UROBILINOGEN FLD QL: 0.2 MG/DL — SIGNIFICANT CHANGE UP (ref 0.2–1)

## 2024-10-10 PROCEDURE — 59025 FETAL NON-STRESS TEST: CPT | Mod: 26,1L

## 2024-10-10 PROCEDURE — 99221 1ST HOSP IP/OBS SF/LOW 40: CPT | Mod: 1L,25

## 2024-10-10 NOTE — OB PROVIDER TRIAGE NOTE - HISTORY OF PRESENT ILLNESS
33y  at 36w2d GA presents to L&D for r/o PTL . Patient denies vaginal bleeding, contractions and leakage of fluid. She endorses good fetal movement. Denies fevers, chills, nausea and vomiting. No other complaints at this time. Prenatal course is significant for:     Prenatal course uncomplicated.    JONATHAN: 23    Primary OBGYN: [facility]  Obhx:  - : 38w  - 6#5  - 2017: 36w  due to PTL - 6#8  - : 37w  - 6#9  Gynhx: Denies fibroids, ovarian cysts, STIs, abnormal PAPs   PMH: Denies, including HTN, DM, asthma  PSH: Denies  Soc hx: Denies alcohol, tobacco and illicit drug use during this pregnancy  Anx/dep:  Denies  Meds: PNV  Allergies: NKDA    EFW:    GBS: unknown      Will accept blood transfusion       33y  at 36w2d GA presents to L&D for r/o PTL . Patient denies vaginal bleeding, contractions and leakage of fluid. She endorses good fetal movement. Denies fevers, chills, nausea and vomiting. No other complaints at this time. Prenatal course is significant for: short cervix was on progesterone. Stopped 4 days ago. Feels pressuyre q30 min since this morning       JONATHAN: 23    Primary OBGYN: [facility]  Obhx:  - : 38w  - 6#5  - 2017: 36w  due to PTL - 6#8  - : 37w  - 6#9  Gynhx: Denies fibroids, ovarian cysts, STIs, abnormal PAPs   PMH: Denies, including HTN, DM, asthma  PSH: Denies  Soc hx: Denies alcohol, tobacco and illicit drug use during this pregnancy  Anx/dep:  Denies  Meds: PNV  Allergies: NKDA      GBS: unknown      Will accept blood transfusion

## 2024-10-10 NOTE — OB PROVIDER TRIAGE NOTE - ADDITIONAL INSTRUCTIONS
Labor Precautions discussed. Return to labor and delivery if contractions are every 2-3 min, there is leaking of fluid or vaginal bleeding. Kick counts discussed. Return to labor and delivery if there are fewer than 4 fetal movements in one hour.

## 2024-10-10 NOTE — OB PROVIDER TRIAGE NOTE - NSHPPHYSICALEXAM_GEN_ALL_CORE
T(C): --  HR: --  BP: --  RR: --  SpO2: --  Gen: NAD, well-appearing   Lungs: CTAB  Heart: RRR   Abd: Soft, gravid  SVE:    Bedside sono:  FHT:  [place]: VE 2.5/60/-3

## 2024-10-10 NOTE — OB PROVIDER TRIAGE NOTE - NSOBPROVIDERNOTE_OBGYN_ALL_OB_FT
A/P: 33y GP at _ weeks GA presents to L&D for .   -Admit to L&D  -Consent  -Admission labs  -CLD or NPO, except ice chips   -IV fluids or Rotating IV fluids (LR/D5LR)  -Labor: Intact/*ROM. Latent/Active labor. [place] *. Induce labor with ______.  -Fetus: Cat I tracing. Continuous toco and fetal monitoring.   -GBS: Negative, no GBS ppx required   -Analgesia: epi PRN  -DVT ppx: Ambulate and SCD's while in bed     Discussed with Dr. Karen Avery, PGY-4 32 yo  @ 36.3 with hx of short cervix evaluated for PTL  -NST in progress  -BPP after NST 32 yo  @ 36.3 with hx of short cervix evaluated for PTL  -NST in progress  -BPP after NST    2325: NST reactive, BPP 8/8. Patient still feeling intermittent pelvic pressure. Irregular contractions. UA being sent 32 yo  @ 36.3 with hx of short cervix evaluated for PTL  -NST in progress  -BPP after NST    2325: NST reactive, BPP 8/8. Patient still feeling intermittent pelvic pressure. Irregular contractions. UA being sent  UA: large leuks, nitrite negative. VE unchanged   Precautions given

## 2024-10-11 VITALS — DIASTOLIC BLOOD PRESSURE: 81 MMHG | SYSTOLIC BLOOD PRESSURE: 130 MMHG | HEART RATE: 89 BPM

## 2024-10-11 LAB
BACTERIA # UR AUTO: ABNORMAL /HPF
CAST: 2 /LPF — SIGNIFICANT CHANGE UP (ref 0–4)
RBC CASTS # UR COMP ASSIST: 1 /HPF — SIGNIFICANT CHANGE UP (ref 0–4)
REVIEW: SIGNIFICANT CHANGE UP
SQUAMOUS # UR AUTO: 13 /HPF — HIGH (ref 0–5)
WBC CLUMPS # UR AUTO: PRESENT
WBC UR QL: 15 /HPF — HIGH (ref 0–5)

## 2024-10-14 DIAGNOSIS — O09.293 SUPERVISION OF PREGNANCY WITH OTHER POOR REPRODUCTIVE OR OBSTETRIC HISTORY, THIRD TRIMESTER: ICD-10-CM

## 2024-10-14 DIAGNOSIS — O34.33 MATERNAL CARE FOR CERVICAL INCOMPETENCE, THIRD TRIMESTER: ICD-10-CM

## 2024-10-14 DIAGNOSIS — O26.873 CERVICAL SHORTENING, THIRD TRIMESTER: ICD-10-CM

## 2024-10-14 DIAGNOSIS — O26.893 OTHER SPECIFIED PREGNANCY RELATED CONDITIONS, THIRD TRIMESTER: ICD-10-CM

## 2024-10-14 DIAGNOSIS — R10.2 PELVIC AND PERINEAL PAIN: ICD-10-CM

## 2024-10-14 DIAGNOSIS — Z3A.36 36 WEEKS GESTATION OF PREGNANCY: ICD-10-CM

## 2024-10-15 ENCOUNTER — APPOINTMENT (OUTPATIENT)
Dept: ANTEPARTUM | Facility: CLINIC | Age: 34
End: 2024-10-15
Payer: MEDICAID

## 2024-10-15 ENCOUNTER — OUTPATIENT (OUTPATIENT)
Dept: OUTPATIENT SERVICES | Facility: HOSPITAL | Age: 34
LOS: 1 days | Discharge: HOME | End: 2024-10-15
Payer: MEDICAID

## 2024-10-15 ENCOUNTER — ASOB RESULT (OUTPATIENT)
Age: 34
End: 2024-10-15

## 2024-10-15 VITALS — SYSTOLIC BLOOD PRESSURE: 127 MMHG | DIASTOLIC BLOOD PRESSURE: 75 MMHG | HEART RATE: 92 BPM

## 2024-10-15 VITALS
TEMPERATURE: 98 F | HEART RATE: 94 BPM | DIASTOLIC BLOOD PRESSURE: 70 MMHG | SYSTOLIC BLOOD PRESSURE: 127 MMHG | RESPIRATION RATE: 16 BRPM

## 2024-10-15 DIAGNOSIS — Z98.890 OTHER SPECIFIED POSTPROCEDURAL STATES: Chronic | ICD-10-CM

## 2024-10-15 DIAGNOSIS — Z90.89 ACQUIRED ABSENCE OF OTHER ORGANS: Chronic | ICD-10-CM

## 2024-10-15 DIAGNOSIS — O26.899 OTHER SPECIFIED PREGNANCY RELATED CONDITIONS, UNSPECIFIED TRIMESTER: ICD-10-CM

## 2024-10-15 PROCEDURE — 99221 1ST HOSP IP/OBS SF/LOW 40: CPT | Mod: 25

## 2024-10-15 PROCEDURE — 59025 FETAL NON-STRESS TEST: CPT | Mod: 26

## 2024-10-15 PROCEDURE — 76815 OB US LIMITED FETUS(S): CPT | Mod: 26

## 2024-10-15 PROCEDURE — 76819 FETAL BIOPHYS PROFIL W/O NST: CPT | Mod: 26

## 2024-10-15 NOTE — OB PROVIDER TRIAGE NOTE - NSOBPROVIDERNOTE_OBGYN_ALL_OB_FT
NST/BPP  VE NST/BPP  VE  d/w Dr Carballo and Dr Fernandes reviewed FHT and presenting problem, and agrees with discharge, fetal movement back to baseline    d/c home at 37.1 wks no evidence of labor, resolved decreased fetal movement  maternal and fetal surveillance reassuring  rest activity as tolerated  increase water intake  labor precautions instructed  keep all OB appointments  fetal kick counts  return for vaginal bleeding, leakage of fluid, decreased fetal movement or any concerns  v/w discharge instructions given with verbal understanding by patient

## 2024-10-15 NOTE — OB PROVIDER TRIAGE NOTE - NSHPPHYSICALEXAM_GEN_ALL_CORE
abd soft gravid NT  CV RRR  LS clear bilaterally  TAS: images saved in ASOB  vertex  anterior placenta  BPP 8/8  +FM seen and felt by patient  [place]: 7.8  SVE: 1.5/60/-3  FHT: moderate variability + accelerations negative decelerations, reactive  [place]oco: irregular

## 2024-10-15 NOTE — OB PROVIDER TRIAGE NOTE - NS_SONONOTE_OBGYN_ALL_OB_FT
Preop has to be within 30 days so if surgery is past 5/4, she will need another appt.  Let me know if it is before.  She might need preop testing anyway.   BPP 8/8 BPP 8/8 +FM seen and felt

## 2024-10-15 NOTE — OB RN TRIAGE NOTE - FALL HARM RISK - FACTORS NURSING JUDGEMENT
[facility] PRACTICE EXAM & DAILY COMMUNICATION NOTE    Patient Active Problem List   Diagnosis     Prematurity     Malnutrition (H)     Apnea of prematurity     Anemia of prematurity     Chronic lung disease of prematurity     Low birth weight infant     Personal history of urinary tract infection     Esophageal reflux     Ineffective infant feeding pattern       VITALS:  Temp:  [98.1  F (36.7  C)-98.9  F (37.2  C)] 98.4  F (36.9  C)  Heart Rate:  [138-151] 146  Resp:  [54-60] 57  BP: (91-93)/(45-57) 91/57  Cuff Mean (mmHg):  [61-73] 69  FiO2 (%):  [100 %] 100 %  SpO2:  [100 %] 100 %      PHYSICAL EXAM:  Constitutional: Resting comfortably, no distress.  Head: Normocephalic. Anterior fontanelle soft, scalp clear.   Oropharynx:. Moist mucous membranes.  No erythema or lesions. NC in place.  Cardiovascular: Regular rate and rhythm. Grade I/VI murmur. Normal S1 & S2. Peripheral/femoral pulses present, normal and symmetric. Extremities warm. Capillary refill <3 seconds peripherally and centrally.  Respiratory: Breath sounds clear with good aeration bilaterally.  Mild subcostal retractions.   Gastrointestinal: Abdomen soft with active bowel sounds. Stooling.  : Normal female genitalia.  Musculoskeletal: extremities normal- no gross deformities noted, normal muscle tone  Skin: Color pink, warm, intact. Small hemangioma noted on left chin/lower lip.   Neurologic: Tone normal and symmetric bilaterally. No focal deficits.     PARENT COMMUNICATION: Parents updated during rounds.     YVON Hutchins-CNP, NNP, 2018 2:31 PM  [facility]             No

## 2024-10-15 NOTE — OB PROVIDER TRIAGE NOTE - HISTORY OF PRESENT ILLNESS
34 yo  @ 37.1 weeks PNC by Dr Araujo reports feeling abdominal pressure 5/10 pain and rectal pressure every half hour since this afternoon, denies vb or lof, reports less fetal movement since yesterday. AP course complicated by short cervix found at 33 weeks- took progesterone from 33-36 weeks gestation. denies fever chills ha n/v new swelling vision changes cp sob or cough. Last saw OB Yesterday had sono.  GBS negative  meds:PNV  All: denies 34 yo  @ 37.1 weeks PNC by Dr Araujo reports feeling abdominal pressure 5/10 pain and rectal pressure every half hour since this afternoon, denies vb or lof, reports less fetal movement since yesterday. AP course complicated by short cervix found at 33 weeks- took progesterone from 33-36 weeks gestation. Last delivery precipitous. denies fever chills ha n/v new swelling vision changes cp sob or cough. Last saw OB Yesterday had sono.    GBS negative  meds: PNV  All: denies

## 2024-10-15 NOTE — OB PROVIDER TRIAGE NOTE - PLAN OF CARE
d/c home at 37.1 wks no evidence of labor, resolved decreased fetal movement  maternal and fetal surveillance reassuring  rest activity as tolerated  increase water intake  labor precautions instructed  keep all OB appointments  fetal kick counts  return for vaginal bleeding, leakage of fluid, decreased fetal movement or any concerns  v/w discharge instructions given with verbal understanding by patient

## 2024-10-17 DIAGNOSIS — O26.893 OTHER SPECIFIED PREGNANCY RELATED CONDITIONS, THIRD TRIMESTER: ICD-10-CM

## 2024-10-17 DIAGNOSIS — R10.9 UNSPECIFIED ABDOMINAL PAIN: ICD-10-CM

## 2024-10-17 DIAGNOSIS — Z3A.37 37 WEEKS GESTATION OF PREGNANCY: ICD-10-CM

## 2024-10-17 DIAGNOSIS — O09.293 SUPERVISION OF PREGNANCY WITH OTHER POOR REPRODUCTIVE OR OBSTETRIC HISTORY, THIRD TRIMESTER: ICD-10-CM

## 2024-10-17 DIAGNOSIS — O34.33 MATERNAL CARE FOR CERVICAL INCOMPETENCE, THIRD TRIMESTER: ICD-10-CM

## 2024-10-17 DIAGNOSIS — O26.873 CERVICAL SHORTENING, THIRD TRIMESTER: ICD-10-CM

## 2024-10-17 DIAGNOSIS — O36.8130 DECREASED FETAL MOVEMENTS, THIRD TRIMESTER, NOT APPLICABLE OR UNSPECIFIED: ICD-10-CM

## 2024-10-19 ENCOUNTER — INPATIENT (INPATIENT)
Facility: HOSPITAL | Age: 34
LOS: 1 days | Discharge: HOME | End: 2024-10-21
Attending: OBSTETRICS & GYNECOLOGY | Admitting: OBSTETRICS & GYNECOLOGY

## 2024-10-19 ENCOUNTER — APPOINTMENT (OUTPATIENT)
Dept: ANTEPARTUM | Facility: CLINIC | Age: 34
End: 2024-10-19

## 2024-10-19 VITALS
HEART RATE: 115 BPM | SYSTOLIC BLOOD PRESSURE: 115 MMHG | RESPIRATION RATE: 16 BRPM | DIASTOLIC BLOOD PRESSURE: 65 MMHG | TEMPERATURE: 98 F

## 2024-10-19 DIAGNOSIS — O26.899 OTHER SPECIFIED PREGNANCY RELATED CONDITIONS, UNSPECIFIED TRIMESTER: ICD-10-CM

## 2024-10-19 DIAGNOSIS — Z98.890 OTHER SPECIFIED POSTPROCEDURAL STATES: Chronic | ICD-10-CM

## 2024-10-19 DIAGNOSIS — Z90.89 ACQUIRED ABSENCE OF OTHER ORGANS: Chronic | ICD-10-CM

## 2024-10-19 LAB
BASOPHILS # BLD AUTO: 0.08 K/UL — SIGNIFICANT CHANGE UP (ref 0–0.2)
BASOPHILS NFR BLD AUTO: 0.5 % — SIGNIFICANT CHANGE UP (ref 0–2)
BLD GP AB SCN SERPL QL: NEGATIVE — SIGNIFICANT CHANGE UP
EOSINOPHIL # BLD AUTO: 0.52 K/UL — HIGH (ref 0–0.5)
EOSINOPHIL NFR BLD AUTO: 3.5 % — SIGNIFICANT CHANGE UP (ref 0–6)
FLUAV AG NPH QL: SIGNIFICANT CHANGE UP
FLUBV AG NPH QL: SIGNIFICANT CHANGE UP
HCT VFR BLD CALC: 37.4 % — SIGNIFICANT CHANGE UP (ref 34.5–45)
HGB BLD-MCNC: 13.1 G/DL — SIGNIFICANT CHANGE UP (ref 11.5–15.5)
HIV 1+2 AB+HIV1 P24 AG SERPL QL IA: SIGNIFICANT CHANGE UP
IANC: 9.75 K/UL — HIGH (ref 1.8–7.4)
IMM GRANULOCYTES NFR BLD AUTO: 1.2 % — HIGH (ref 0–0.9)
LYMPHOCYTES # BLD AUTO: 21.3 % — SIGNIFICANT CHANGE UP (ref 13–44)
LYMPHOCYTES # BLD AUTO: 3.18 K/UL — SIGNIFICANT CHANGE UP (ref 1–3.3)
MCHC RBC-ENTMCNC: 26.4 PG — LOW (ref 27–34)
MCHC RBC-ENTMCNC: 35 GM/DL — SIGNIFICANT CHANGE UP (ref 32–36)
MCV RBC AUTO: 75.3 FL — LOW (ref 80–100)
MONOCYTES # BLD AUTO: 1.21 K/UL — HIGH (ref 0–0.9)
MONOCYTES NFR BLD AUTO: 8.1 % — SIGNIFICANT CHANGE UP (ref 2–14)
NEUTROPHILS # BLD AUTO: 9.75 K/UL — HIGH (ref 1.8–7.4)
NEUTROPHILS NFR BLD AUTO: 65.4 % — SIGNIFICANT CHANGE UP (ref 43–77)
NRBC # BLD: 0 /100 WBCS — SIGNIFICANT CHANGE UP (ref 0–0)
NRBC # FLD: 0 K/UL — SIGNIFICANT CHANGE UP (ref 0–0)
PLATELET # BLD AUTO: 387 K/UL — SIGNIFICANT CHANGE UP (ref 150–400)
RBC # BLD: 4.97 M/UL — SIGNIFICANT CHANGE UP (ref 3.8–5.2)
RBC # FLD: 16.7 % — HIGH (ref 10.3–14.5)
RH IG SCN BLD-IMP: POSITIVE — SIGNIFICANT CHANGE UP
RH IG SCN BLD-IMP: POSITIVE — SIGNIFICANT CHANGE UP
RSV RNA NPH QL NAA+NON-PROBE: SIGNIFICANT CHANGE UP
SARS-COV-2 RNA SPEC QL NAA+PROBE: SIGNIFICANT CHANGE UP
WBC # BLD: 14.92 K/UL — HIGH (ref 3.8–10.5)
WBC # FLD AUTO: 14.92 K/UL — HIGH (ref 3.8–10.5)

## 2024-10-19 RX ORDER — CHLORHEXIDINE GLUCONATE 40 MG/ML
1 SOLUTION TOPICAL DAILY
Refills: 0 | Status: DISCONTINUED | OUTPATIENT
Start: 2024-10-19 | End: 2024-10-20

## 2024-10-19 RX ORDER — OXYTOCIN IN D5W-0.2% SODIUM CL 15/250 ML
167 PLASTIC BAG, INJECTION (ML) INTRAVENOUS
Qty: 30 | Refills: 0 | Status: DISCONTINUED | OUTPATIENT
Start: 2024-10-19 | End: 2024-10-21

## 2024-10-19 RX ADMIN — Medication 125 MILLILITER(S): at 22:09

## 2024-10-19 RX ADMIN — CHLORHEXIDINE GLUCONATE 1 APPLICATION(S): 40 SOLUTION TOPICAL at 22:09

## 2024-10-19 RX ADMIN — Medication 1000 MILLILITER(S): at 21:46

## 2024-10-19 NOTE — OB RN PATIENT PROFILE - NSSDOHUTIL_OBGYN_A_OB
Impression: Nexdtve age-related mclr degn, left eye, intermed dry stage: H35.3122. Plan: Patient notes distortion in vision. Exam and OCT demonstrate stable drusen and RPE changes confirming AMD is still dry. The patient was advised to continue AREDS 2 vitamin supplements; the risk of smoking was emphasized with the patient. The patient was also advised to continue to use an [id number] to monitor the vision and to call immediately with any changes. Will closely monitor given suspicious findings on OCT. no

## 2024-10-19 NOTE — OB PROVIDER H&P - ASSESSMENT
33 year old female  at  37.5 week latent labor     URI s/s r/o Covid /Flu/ RSV     epidural for pain management     consider Pitocin prn      case d/w Dr Carballo   - Admit to Labor and Delivery  - Continuous EFM  - Clear diet, IV fluids  - Consent signed  - Standard labs  - Epidural      Risks, benefits, alternatives, and possible complications have been discussed in detail with the patient in her native language. Pre-admission, admission, and post admission procedures and expectations were discussed in detail. All questions answered, all appropriate hospital consents were signed. Anticipate normal vaginal delivery.    Informed consent was obtained. The following was discussed:  - Induction/augmentation of labor: use of medication and/or cook balloon to begin or enhance labor  - Obstetrical management including internal fetal/contraction monitoring  - Normal vaginal delivery  - Possible  section    KOKI CHILDERS      33 year old female  at  37.5 week latent labor     URI s/s r/o Covid /Flu/ RSV     epidural for pain management     consider Pitocin prn      case d/w Dr Carballo   - Admit to Labor and Delivery  - Continuous EFM  - Clear diet, IV fluids  - Consent signed  - Standard labs  - Epidural      Risks, benefits, alternatives, and possible complications have been discussed in detail with the patient in her native language. Pre-admission, admission, and post admission procedures and expectations were discussed in detail. All questions answered, all appropriate hospital consents were signed. Anticipate normal vaginal delivery.    Informed consent was obtained. The following was discussed:  - Induction/augmentation of labor: use of medication and/or cook balloon to begin or enhance labor  - Obstetrical management including internal fetal/contraction monitoring  - Normal vaginal delivery  - Possible  section    KOKI CHILDERS     Attending attestation:  Agree with findings and plan as documented.     33 year old female  at 37.5 week gestation admitted for spontaneous labor, progressed from 3 to 4cm.   PNC Dr Araujo      33 year old female  at  37.5 week latent labor     URI s/s r/o Covid /Flu/ RSV     epidural for pain management     consider Pitocin prn      case d/w Dr Carballo   - Admit to Labor and Delivery  - Continuous EFM  - Clear diet, IV fluids  - Consent signed  - Standard labs  - Epidural      Risks, benefits, alternatives, and possible complications have been discussed in detail with the patient in her native language. Pre-admission, admission, and post admission procedures and expectations were discussed in detail. All questions answered, all appropriate hospital consents were signed. Anticipate normal vaginal delivery.    Informed consent was obtained. The following was discussed:  - Induction/augmentation of labor: use of medication and/or cook balloon to begin or enhance labor  - Obstetrical management including internal fetal/contraction monitoring  - Normal vaginal delivery  - Possible  section    KOKI CHILDERS     Attending attestation:  Agree with findings and plan as documented.     33 year old female  at 37.5 week gestation admitted for spontaneous labor, progressed from 3 to 4cm. Pregnancy otherwise uncomplicated.   PNC Dr Araujo     Plan for epidural and reassess after  pitocin augmentation prn   AROM when feasible  anticipate CARLI Carballo MD

## 2024-10-19 NOTE — OB PROVIDER H&P - HISTORY OF PRESENT ILLNESS
33 year old female  at 37.5 week gestationwho presents with contractions and back pain  6/10 and q 5-10 minutes    and stated was 3 cm dilated in office this am     denied any LOF VB feels good      PNC Dr Araujo    prenatals  not available    stated can see on her portal  stated GBS negative   EFW 7#           33 year old female  at 37.5 week gestation who presents with contractions and back pain  6/10 and q 5-10 minutes    and stated was 3 cm dilated in office this am     denied any LOF VB feels good      PNC Dr Araujo    prenatals  not available    stated can see on her portal  stated GBS negative   EFW 7#

## 2024-10-19 NOTE — OB PROVIDER H&P - NSHPPHYSICALEXAM_GEN_ALL_CORE
pt seen and examined    pt uncomfortable with contractions    alert Ox3   lungs clear    heart s1 s2   abd soft gravid  non tender    placed on EFM    NST reactive  with contractions  every 3-10 minutes    VE  4/60/-3  vertex    scan vertex aafi 11 BPP 8/8  anterior placenta   images saved to [facility]

## 2024-10-20 LAB
HBV SURFACE AG SERPL QL IA: SIGNIFICANT CHANGE UP
HCV AB S/CO SERPL IA: 0.09 S/CO — SIGNIFICANT CHANGE UP (ref 0–0.99)
HCV AB SERPL-IMP: SIGNIFICANT CHANGE UP

## 2024-10-20 RX ORDER — ACETAMINOPHEN 500 MG
975 TABLET ORAL
Refills: 0 | Status: DISCONTINUED | OUTPATIENT
Start: 2024-10-20 | End: 2024-10-21

## 2024-10-20 RX ORDER — OXYTOCIN IN D5W-0.2% SODIUM CL 15/250 ML
167 PLASTIC BAG, INJECTION (ML) INTRAVENOUS
Qty: 30 | Refills: 0 | Status: DISCONTINUED | OUTPATIENT
Start: 2024-10-20 | End: 2024-10-21

## 2024-10-20 RX ORDER — MAGNESIUM HYDROXIDE 1200 MG/15ML
30 SUSPENSION ORAL
Refills: 0 | Status: DISCONTINUED | OUTPATIENT
Start: 2024-10-20 | End: 2024-10-21

## 2024-10-20 RX ORDER — BENZOCAINE 200 MG/G
1 GEL ORAL EVERY 6 HOURS
Refills: 0 | Status: DISCONTINUED | OUTPATIENT
Start: 2024-10-20 | End: 2024-10-21

## 2024-10-20 RX ORDER — HYDROCORTISONE 1 %
1 OINTMENT (GRAM) TOPICAL EVERY 6 HOURS
Refills: 0 | Status: DISCONTINUED | OUTPATIENT
Start: 2024-10-20 | End: 2024-10-21

## 2024-10-20 RX ORDER — OXYTOCIN IN D5W-0.2% SODIUM CL 15/250 ML
PLASTIC BAG, INJECTION (ML) INTRAVENOUS
Qty: 30 | Refills: 0 | Status: DISCONTINUED | OUTPATIENT
Start: 2024-10-20 | End: 2024-10-20

## 2024-10-20 RX ORDER — OXYTOCIN IN D5W-0.2% SODIUM CL 15/250 ML
2 PLASTIC BAG, INJECTION (ML) INTRAVENOUS
Qty: 30 | Refills: 0 | Status: DISCONTINUED | OUTPATIENT
Start: 2024-10-20 | End: 2024-10-21

## 2024-10-20 RX ORDER — SODIUM CHLORIDE 9 MG/ML
300 INJECTION, SOLUTION INTRAMUSCULAR; INTRAVENOUS; SUBCUTANEOUS ONCE
Refills: 0 | Status: DISCONTINUED | OUTPATIENT
Start: 2024-10-20 | End: 2024-10-21

## 2024-10-20 RX ORDER — OXYCODONE HYDROCHLORIDE 30 MG/1
5 TABLET ORAL ONCE
Refills: 0 | Status: DISCONTINUED | OUTPATIENT
Start: 2024-10-20 | End: 2024-10-21

## 2024-10-20 RX ORDER — OXYCODONE HYDROCHLORIDE 30 MG/1
5 TABLET ORAL
Refills: 0 | Status: DISCONTINUED | OUTPATIENT
Start: 2024-10-20 | End: 2024-10-21

## 2024-10-20 RX ORDER — IBUPROFEN 200 MG
600 TABLET ORAL EVERY 6 HOURS
Refills: 0 | Status: COMPLETED | OUTPATIENT
Start: 2024-10-20 | End: 2025-09-18

## 2024-10-20 RX ORDER — MODIFIED LANOLIN
1 OINTMENT (GRAM) TOPICAL EVERY 6 HOURS
Refills: 0 | Status: DISCONTINUED | OUTPATIENT
Start: 2024-10-20 | End: 2024-10-21

## 2024-10-20 RX ORDER — CLOSTRIDIUM TETANI TOXOID ANTIGEN (FORMALDEHYDE INACTIVATED), CORYNEBACTERIUM DIPHTHERIAE TOXOID ANTIGEN (FORMALDEHYDE INACTIVATED), BORDETELLA PERTUSSIS TOXOID ANTIGEN (GLUTARALDEHYDE INACTIVATED), BORDETELLA PERTUSSIS FILAMENTOUS HEMAGGLUTININ ANTIGEN (FORMALDEHYDE INACTIVATED), BORDETELLA PERTUSSIS PERTACTIN ANTIGEN, AND BORDETELLA PERTUSSIS FIMBRIAE 2/3 ANTIGEN 5; 2; 2.5; 5; 3; 5 [LF]/.5ML; [LF]/.5ML; UG/.5ML; UG/.5ML; UG/.5ML; UG/.5ML
0.5 INJECTION, SUSPENSION INTRAMUSCULAR ONCE
Refills: 0 | Status: DISCONTINUED | OUTPATIENT
Start: 2024-10-20 | End: 2024-10-21

## 2024-10-20 RX ORDER — PRENATAL VIT/IRON FUM/FOLIC AC 60 MG-1 MG
1 TABLET ORAL DAILY
Refills: 0 | Status: DISCONTINUED | OUTPATIENT
Start: 2024-10-20 | End: 2024-10-21

## 2024-10-20 RX ORDER — SODIUM CHLORIDE 9 MG/ML
3 INJECTION, SOLUTION INTRAMUSCULAR; INTRAVENOUS; SUBCUTANEOUS EVERY 8 HOURS
Refills: 0 | Status: DISCONTINUED | OUTPATIENT
Start: 2024-10-20 | End: 2024-10-21

## 2024-10-20 RX ORDER — DIPHENHYDRAMINE HCL 12.5MG/5ML
25 ELIXIR ORAL EVERY 6 HOURS
Refills: 0 | Status: DISCONTINUED | OUTPATIENT
Start: 2024-10-20 | End: 2024-10-21

## 2024-10-20 RX ORDER — IBUPROFEN 200 MG
600 TABLET ORAL EVERY 6 HOURS
Refills: 0 | Status: DISCONTINUED | OUTPATIENT
Start: 2024-10-20 | End: 2024-10-21

## 2024-10-20 RX ORDER — SODIUM CHLORIDE 9 MG/ML
1000 INJECTION, SOLUTION INTRAMUSCULAR; INTRAVENOUS; SUBCUTANEOUS
Refills: 0 | Status: DISCONTINUED | OUTPATIENT
Start: 2024-10-20 | End: 2024-10-21

## 2024-10-20 RX ORDER — SIMETHICONE 80 MG/1
80 TABLET, CHEWABLE ORAL EVERY 4 HOURS
Refills: 0 | Status: DISCONTINUED | OUTPATIENT
Start: 2024-10-20 | End: 2024-10-21

## 2024-10-20 RX ORDER — PRAMOXINE HCL 1 %
1 CREAM (GRAM) RECTAL EVERY 4 HOURS
Refills: 0 | Status: DISCONTINUED | OUTPATIENT
Start: 2024-10-20 | End: 2024-10-21

## 2024-10-20 RX ORDER — DIBUCAINE 1 %
1 OINTMENT (GRAM) TOPICAL EVERY 6 HOURS
Refills: 0 | Status: DISCONTINUED | OUTPATIENT
Start: 2024-10-20 | End: 2024-10-21

## 2024-10-20 RX ORDER — KETOROLAC TROMETHAMINE 30 MG/ML
30 INJECTION INTRAMUSCULAR; INTRAVENOUS ONCE
Refills: 0 | Status: DISCONTINUED | OUTPATIENT
Start: 2024-10-20 | End: 2024-10-20

## 2024-10-20 RX ADMIN — KETOROLAC TROMETHAMINE 30 MILLIGRAM(S): 30 INJECTION INTRAMUSCULAR; INTRAVENOUS at 16:10

## 2024-10-20 RX ADMIN — Medication 975 MILLIGRAM(S): at 21:49

## 2024-10-20 RX ADMIN — Medication 600 MILLIGRAM(S): at 23:19

## 2024-10-20 RX ADMIN — Medication 1000 MILLILITER(S): at 07:30

## 2024-10-20 RX ADMIN — Medication 975 MILLIGRAM(S): at 21:19

## 2024-10-20 RX ADMIN — Medication 2 MILLIUNIT(S)/MIN: at 09:04

## 2024-10-20 RX ADMIN — Medication 600 MILLIGRAM(S): at 23:51

## 2024-10-20 RX ADMIN — KETOROLAC TROMETHAMINE 30 MILLIGRAM(S): 30 INJECTION INTRAMUSCULAR; INTRAVENOUS at 15:33

## 2024-10-20 RX ADMIN — SODIUM CHLORIDE 125 MILLILITER(S): 9 INJECTION, SOLUTION INTRAMUSCULAR; INTRAVENOUS; SUBCUTANEOUS at 09:54

## 2024-10-20 RX ADMIN — Medication 125 MILLILITER(S): at 09:03

## 2024-10-20 NOTE — OB PROVIDER LABOR PROGRESS NOTE - ASSESSMENT
P3 admitted in early labor, found to be making cervical change. Discussed with patient risks/benefits of Oxytocin for labor induction and patient agrees to proceed.    - Will start oxytocin 2x2 per Dr. Garcia  - Epi PRN  - EFM, [place]    Plan per Dr. Jose Blanco PGY3
Pit @18u  CE as indicated  FHT monitoring  consider IUPC and AI if variables persist    DTaveras PGY3
Pt is a 32yo  admitted for labor    - Pitocin currently at 6mu, to continue increasing  - Pt reports SROM@245a  - Continue cont EFM, toco, IVF  - GBS unknown  - Epidural in place    D/w Dr. Garcia, attending  Hanna Zhu, PGY-1
ephedrine given at bedside with improvement in blood pressures  IUPC placed    d/w attg Dr Michele Chavez  PGY4
IUPC replaced, amnioinfusion started  Pitocin discontinued  Pt placed in right and left lateral, intermittent return to baseline  Loss of contact x2 due to ISE sticker displaced  Pt placed in all 4s position  Return to baseline  Continue to monitor    jermain BECERRA

## 2024-10-20 NOTE — OB PROVIDER LABOR PROGRESS NOTE - NS_OBIHIFHRDETAILS_OBGYN_ALL_OB_FT
135/mod variability/+accels/+intermittent late decels
Cat 2 with variable decels with moderate variability
135/mod/+Accel/-Decel
baseline 145bpm, moderate variability, +accels, +intermittent variable decelerations

## 2024-10-20 NOTE — OB PROVIDER DELIVERY SUMMARY - NSLOWPPHRISK_OBGYN_A_OB
[place] Pregnancy/Less than or equal to 4 previous vaginal births/No known bleeding disorder/No history of postpartum hemorrhage/No other PPH risks indicated

## 2024-10-20 NOTE — OB RN DELIVERY SUMMARY - NSSELHIDDEN_OBGYN_ALL_OB_FT
[NS_DeliveryAttending1_OBGYN_ALL_OB_FT:[id number]],[NS_DeliveryAssist1_OBGYN_ALL_OB_FT:[id number]],[NS_DeliveryAssist2_OBGYN_ALL_OB_FT:[id number]],[NS_DeliveryRN_OBGYN_ALL_OB_FT:[id number]]

## 2024-10-20 NOTE — OB NEONATOLOGY/PEDIATRICIAN DELIVERY SUMMARY - NSPEDSNEONOTESA_OBGYN_ALL_OB_FT
Requested by OB to attend this vaginal delivery at 37.6 weeks for category II fetal heart tracing. Mother is a 33 year old,  , blood type A pos.  Prenatal labs as follow: HIV neg, RPR pending, rubella pending, HBsA pending, GBS neg on 10/04.  Maternal history significant for tonsillectomy and SAB with D+C. Prenatal history significant for none. This pregnancy was complicated by none. IOL for inadequate contractions. SROM on 10/20 at 02:45  - with clear fluid - 9.5 hours prior to delivery. Highest maternal temp 36.8 C. EOS 0.12. This provider arrived after 5 MOL. Per RN report, infant emerged with nuchal x1, right compound hand. Then brought to warmer. Dried, suctioned and stimulated . Apgars  8/9. Mom wishes to breast/bottle feed. Consents to hep B vaccine. Declines circumcision. Infant admitted to NBN for routine care. Parents updated in L&D.

## 2024-10-20 NOTE — OB PROVIDER DELIVERY SUMMARY - NS_FINALEDD_OBGYN_ALL_OB_DT
Strep Throat-  Rapid strep is positive  start antibiotics as prescribed  warm salt water gargles  Start coricidin from over the counter to help with cough  continue Ibuprofen/tylenol from over the counter for pain/fever relief  wash hands frequently  avoid close contacts for the first 24-48 hours after starting antibiotics  F/u with PCP if no improvement of symptoms in 3-4 days as needed.   04-Nov-2024

## 2024-10-20 NOTE — OB PROVIDER LABOR PROGRESS NOTE - NS_SUBJECTIVE/OBJECTIVE_OBGYN_ALL_OB_FT
Pt examined to place IUPC for contraction monitoring
Pt seen at bedside for prolonged decel
Patient seen for cervical exam with variable decels,
Late entry due to clinical duties. Patient seen at 250a    S:  Pt seen and examined for cervical change.    O:  Vital Signs Last 24 Hrs  T(C): 36.7 (19 Oct 2024 20:22), Max: 36.7 (19 Oct 2024 16:28)  T(F): 98.1 (19 Oct 2024 20:22), Max: 98.1 (19 Oct 2024 16:28)  HR: 94 (20 Oct 2024 03:32) (67 - 115)  BP: 109/62 (20 Oct 2024 03:20) (81/51 - 168/67)  BP(mean): --  RR: 17 (19 Oct 2024 20:22) (16 - 17)  SpO2: 94% (20 Oct 2024 03:32) (92% - 100%)    Parameters below as of 19 Oct 2024 20:22  Patient On (Oxygen Delivery Method): room air
Patient seen at bedside to assess for cervical change.

## 2024-10-20 NOTE — OB PROVIDER DELIVERY SUMMARY - NSSELHIDDEN_OBGYN_ALL_OB_FT
[NS_DeliveryAttending1_OBGYN_ALL_OB_FT:[id number]],[NS_DeliveryAssist1_OBGYN_ALL_OB_FT:[id number]],[NS_DeliveryAssist2_OBGYN_ALL_OB_FT:[id number]=]

## 2024-10-20 NOTE — OB PROVIDER DELIVERY SUMMARY - NSPROVIDERDELIVERYNOTE_OBGYN_ALL_OB_FT
Spontaneous vaginal delivery of liveborn male infant from OA position. Head, shoulders, and body delivered easily. Infant was suctioned. No mec. Delayed cord clamping x 60s. Cord then cut and infant was passed to mother then peds. Placenta delivered intact with a 3 vessel cord. Fundal massage was given and uterine fundus was found to be firm. Vaginal exam revealed an intact cervix, vaginal walls and sulci. Pernium intact. Excellent hemostasis was noted. Patient was stable. Count was correct x 2. EBL 74ccs.    Nara Sevilla MD PGY1

## 2024-10-20 NOTE — OB RN DELIVERY SUMMARY - NS_SEPSISRSKCALC_OBGYN_ALL_OB_FT
EOS calculated successfully. EOS Risk Factor: 0.5/1000 live births ([facility] national incidence); GA=37w6d; Temp=98.1; ROM=9.333; GBS='Unknown'; Antibiotics='No antibiotics or any antibiotics < 2 hrs prior to birth'

## 2024-10-21 ENCOUNTER — TRANSCRIPTION ENCOUNTER (OUTPATIENT)
Age: 34
End: 2024-10-21

## 2024-10-21 VITALS
RESPIRATION RATE: 18 BRPM | DIASTOLIC BLOOD PRESSURE: 67 MMHG | SYSTOLIC BLOOD PRESSURE: 122 MMHG | HEART RATE: 87 BPM | OXYGEN SATURATION: 100 % | TEMPERATURE: 98 F

## 2024-10-21 LAB
BASOPHILS # BLD AUTO: 0.06 K/UL — SIGNIFICANT CHANGE UP (ref 0–0.2)
BASOPHILS NFR BLD AUTO: 0.4 % — SIGNIFICANT CHANGE UP (ref 0–2)
EOSINOPHIL # BLD AUTO: 0.47 K/UL — SIGNIFICANT CHANGE UP (ref 0–0.5)
EOSINOPHIL NFR BLD AUTO: 2.8 % — SIGNIFICANT CHANGE UP (ref 0–6)
HCT VFR BLD CALC: 32.1 % — LOW (ref 34.5–45)
HGB BLD-MCNC: 11.2 G/DL — LOW (ref 11.5–15.5)
IANC: 11.11 K/UL — HIGH (ref 1.8–7.4)
IMM GRANULOCYTES NFR BLD AUTO: 0.7 % — SIGNIFICANT CHANGE UP (ref 0–0.9)
LYMPHOCYTES # BLD AUTO: 20.9 % — SIGNIFICANT CHANGE UP (ref 13–44)
LYMPHOCYTES # BLD AUTO: 3.48 K/UL — HIGH (ref 1–3.3)
MCHC RBC-ENTMCNC: 26.3 PG — LOW (ref 27–34)
MCHC RBC-ENTMCNC: 34.9 GM/DL — SIGNIFICANT CHANGE UP (ref 32–36)
MCV RBC AUTO: 75.4 FL — LOW (ref 80–100)
MONOCYTES # BLD AUTO: 1.46 K/UL — HIGH (ref 0–0.9)
MONOCYTES NFR BLD AUTO: 8.7 % — SIGNIFICANT CHANGE UP (ref 2–14)
NEUTROPHILS # BLD AUTO: 11.11 K/UL — HIGH (ref 1.8–7.4)
NEUTROPHILS NFR BLD AUTO: 66.5 % — SIGNIFICANT CHANGE UP (ref 43–77)
NRBC # BLD: 0 /100 WBCS — SIGNIFICANT CHANGE UP (ref 0–0)
NRBC # FLD: 0 K/UL — SIGNIFICANT CHANGE UP (ref 0–0)
PLATELET # BLD AUTO: 319 K/UL — SIGNIFICANT CHANGE UP (ref 150–400)
RBC # BLD: 4.26 M/UL — SIGNIFICANT CHANGE UP (ref 3.8–5.2)
RBC # FLD: 17.2 % — HIGH (ref 10.3–14.5)
RUBV IGG SER-ACNC: 8.79 INDEX — SIGNIFICANT CHANGE UP
RUBV IGG SER-IMP: POSITIVE — SIGNIFICANT CHANGE UP
T PALLIDUM AB TITR SER: NEGATIVE — SIGNIFICANT CHANGE UP
WBC # BLD: 16.69 K/UL — HIGH (ref 3.8–10.5)
WBC # FLD AUTO: 16.69 K/UL — HIGH (ref 3.8–10.5)

## 2024-10-21 RX ORDER — ACETAMINOPHEN 500 MG
2 TABLET ORAL
Qty: 0 | Refills: 0 | DISCHARGE
Start: 2024-10-21

## 2024-10-21 RX ORDER — IBUPROFEN 200 MG
3 TABLET ORAL
Qty: 0 | Refills: 0 | DISCHARGE
Start: 2024-10-21

## 2024-10-21 RX ADMIN — Medication 600 MILLIGRAM(S): at 12:18

## 2024-10-21 RX ADMIN — Medication 600 MILLIGRAM(S): at 18:10

## 2024-10-21 RX ADMIN — Medication 975 MILLIGRAM(S): at 09:30

## 2024-10-21 RX ADMIN — Medication 975 MILLIGRAM(S): at 09:00

## 2024-10-21 RX ADMIN — Medication 1 TABLET(S): at 12:18

## 2024-10-21 RX ADMIN — Medication 600 MILLIGRAM(S): at 06:23

## 2024-10-21 RX ADMIN — Medication 975 MILLIGRAM(S): at 15:46

## 2024-10-21 RX ADMIN — Medication 975 MILLIGRAM(S): at 15:17

## 2024-10-21 RX ADMIN — Medication 600 MILLIGRAM(S): at 12:50

## 2024-10-21 RX ADMIN — Medication 975 MILLIGRAM(S): at 03:55

## 2024-10-21 NOTE — PROGRESS NOTE ADULT - ATTENDING COMMENTS
Associate Chief of L & D (Late entry)     I have met this patient for the first time today.  Gets her care at [facility] was admitted in labor by Dr Carballo and delivered by Dr Bautista    OB Progress Note:  PPD#1    S: 34yo  PPD#1 s/p . Patient feels well. Pain is well controlled. She is tolerating a regular diet and passing flatus. She is voiding spontaneously, and ambulating without difficulty. Denies CP/SOB. Denies lightheadedness/dizziness. Denies N/V.    O:  Vitals:  Vital Signs Last 24 Hrs  T(C): 36.4 (21 Oct 2024 05:59), Max: 37 (20 Oct 2024 21:59)  T(F): 97.6 (21 Oct 2024 05:59), Max: 98.6 (20 Oct 2024 21:59)  HR: 66 (21 Oct 2024 05:59) (66 - 98)  BP: 124/62 (21 Oct 2024 05:59) (106/71 - 133/71)  RR: 17 (21 Oct 2024 05:59) (17 - 18)  SpO2: 100% (21 Oct 2024 05:59) (98% - 100%)    Parameters below as of 21 Oct 2024 05:59  Patient On (Oxygen Delivery Method): room air        MEDICATIONS  (STANDING):  acetaminophen     Tablet .. 975 milliGRAM(s) Oral <User Schedule>  diphtheria/tetanus/pertussis (acellular) Vaccine (Adacel) 0.5 milliLiter(s) IntraMuscular once  ibuprofen  Tablet. 600 milliGRAM(s) Oral every 6 hours  prenatal multivitamin 1 Tablet(s) Oral daily  sodium chloride 0.9% lock flush 3 milliLiter(s) IV Push every 8 hours      Labs:  Blood type: A Positive  Rubella IgG: RPR: Negative                          11.2[L]   16.69[H] >-----------< 319    ( 10-21 @ 05:05 )             32.1[L]                        13.1   14.92[H] >-----------< 387    ( 10-19 @ 20:05 )             37.4        Physical Exam:    Abdomen: soft, non-tender, non-distended, fundus firm  Vaginal: Lochia scant   Extremities: No erythema/trace edema    A/P: 34yo PPD#1 s/p      - Pain well controlled, continue current pain regimen  - Increase ambulation, SCDs when not ambulating  - Continue regular diet    Noreen Abebe M.D., M.B.A., M.S.

## 2024-10-21 NOTE — DISCHARGE NOTE OB - MEDICATION SUMMARY - MEDICATIONS TO TAKE
I will START or STAY ON the medications listed below when I get home from the hospital:    ibuprofen 200 mg oral tablet  -- 3 tab(s) by mouth every 6 hours  -- Indication: For pain    acetaminophen 500 mg oral tablet  -- 2 tab(s) by mouth every 6 hours  -- Indication: For pain

## 2024-10-21 NOTE — DISCHARGE NOTE OB - CARE PROVIDER_API CALL
Kristin Araujo  Obstetrics and Gynecology  [address], [address]  [place] [address]  Phone: [phone]  Fax: [phone]  Follow Up Time:

## 2024-10-21 NOTE — PROGRESS NOTE ADULT - ASSESSMENT
32y/o PPD#1 from . Patient is stable and doing well post-operatively with no acute concerns at this time.      #PP  - VSS  - QBL: 74 H:H:  13.1/374->11.2/32.1  - Continue regular diet and PO hydration  - Increase ambulation, OOB and SCD's while in bed  - Continue motrin, tylenol, oxycodone PRN for pain control. Encouraged pt to request PRN pain medication for uncontrolled pain.  - Continue to monitor vitals and symptoms    Sandrita Diego, PGY-1

## 2024-10-21 NOTE — DISCHARGE NOTE OB - PATIENT PORTAL LINK FT
You can access the FollowMyHealth Patient Portal offered by [facility] by registering at the following website: [email or web address]. By joining [facility]’s FollowMyHealth portal, you will also be able to view your health information using other applications (apps) compatible with our system.

## 2024-10-21 NOTE — DISCHARGE NOTE OB - MATERIALS PROVIDED
Vaccinations/[place]  Screening Program/  Immunization Record/Guide to Postpartum Care/Shaken Baby Prevention Handout/Birth Certificate Instructions

## 2024-10-21 NOTE — DISCHARGE NOTE OB - CARE PLAN
Principal Discharge DX:	Normal labor  Assessment and plan of treatment:	After discharge, please stay on pelvic rest for 6 weeks, meaning no sexual intercourse, no tampons and no douching.  No driving for 2 weeks as women can loose a lot of blood during delivery and there is a possibility of being lightheaded/fainting.  No lifting objects heavier than baby for two weeks.  Expect to have vaginal bleeding/spotting for up to six weeks.  The bleeding should get lighter and more white/light brown with time.  For bleeding soaking more than a pad an hour or passing clots greater than the size of your fist, come in to the emergency department.    Follow up in clinic in 6 weeks.   1

## 2024-10-21 NOTE — DISCHARGE NOTE OB - FINANCIAL ASSISTANCE
[facility] provides services at a reduced cost to those who are determined to be eligible through [facility]’s financial assistance program. Information regarding [facility]’s financial assistance program can be found by going to [email or web address] or by calling [phone].

## 2024-10-21 NOTE — PROGRESS NOTE ADULT - SUBJECTIVE AND OBJECTIVE BOX
OB Progress Note:  PPD#1    S: 34yo PPD#1 s/p . Patient feels well. Pain is well controlled. She is tolerating a regular diet and passing flatus. She is voiding spontaneously, and ambulating without difficulty. Endorses light vaginal bleeding, soaking less than 1 pad/hour. Denies headaches/dizziness, fevers/chills. Denies CP/SOB. Denies N/V.     O:  Vitals:  Vital Signs Last 24 Hrs  T(C): 36.4 (21 Oct 2024 05:59), Max: 37 (20 Oct 2024 21:59)  T(F): 97.6 (21 Oct 2024 05:59), Max: 98.6 (20 Oct 2024 21:59)  HR: 66 (21 Oct 2024 05:59) (66 - 112)  BP: 124/62 (21 Oct 2024 05:59) (106/71 - 133/71)  BP(mean): --  RR: 17 (21 Oct 2024 05:59) (17 - 18)  SpO2: 100% (21 Oct 2024 05:59) (92% - 100%)    Parameters below as of 21 Oct 2024 05:59  Patient On (Oxygen Delivery Method): room air        MEDICATIONS  (STANDING):  acetaminophen     Tablet .. 975 milliGRAM(s) Oral <User Schedule>  diphtheria/tetanus/pertussis (acellular) Vaccine (Adacel) 0.5 milliLiter(s) IntraMuscular once  ibuprofen  Tablet. 600 milliGRAM(s) Oral every 6 hours  prenatal multivitamin 1 Tablet(s) Oral daily  sodium chloride 0.9% lock flush 3 milliLiter(s) IV Push every 8 hours      Labs:  Blood type: A Positive  Rubella IgG: RPR: Negative                          11.2[L]   16.69[H] >-----------< 319    ( 10-21 @ 05:05 )             32.1[L]                        13.1   14.92[H] >-----------< 387    ( 10-19 @ 20:05 )             37.4                  Physical Exam:  General: NAD  Heart: all extremities well perfused  Lungs: breathing comfortably  Abdomen: soft, non-tender, non-distended, fundus firm  Vaginal: lochia wnl  Extremities: No calf tenderness, erythema or edema.

## 2024-10-21 NOTE — DISCHARGE NOTE OB - HOSPITAL COURSE
Patient had an uncomplicated  followed by an uncomplicated postpartum course. QBL 74. Hct: 37.4->32.1. On postpartum day #1, patient was discharged home in stable condition, voiding spontaneously and with normal vital signs.

## 2025-06-11 NOTE — OB RN PATIENT PROFILE - NS_PRENATALCARE_OBGYN_ALL_OB
OUTPATIENT PROGRESS NOTE    HISTORY  The patient is a 87 year old female who comes in for a 4 month follow up visit.     Patient was last seen in our office on 2/11/2025 for TCM visit.     Patient comes today accompanied by her niece, POA. Patient resides at [facility].     History of COPD. Patient was admitted to hospital last in 1/2025 with exacerbation. Patient was discharged back with Duonebs. She was also treated with prednisone and zpak. Currently using Breo 100/25 mcg one puff once daily and  Incruse Ellipta one puff daily. Breathing is stable today. Will continue to monitor. Up to date on vaccinations.  Overall stable on the current inhalers.    History of atrial fib. Developed post hip surgery. Patient was put on Eliquis after an EKG on 7/28/24 reported atrial fibrillation. Patient was seen by cardiology last on 9/10/2024, they discontinued her Eliquis due to high risk of falls. They also discussed doing 30 day event monitor and discussed watchman procedure. This was discussed with family in detail about risk versus benefit of anticoagulation versus watchman procedure. Patient and family elected to stay on low dose Eliquis 2.5 mg bid at that time. Patient is also maintained on metoprolol XL 25 mg daily. Will continue to monitor closely. Patient was last seen by cardiology on 5/12/2025, they discontinued her Eliquis due to falls. Patient remains asymptomatic. Will lower metoprolol today due to fatigue.     History of recurrent UTI's, following with [facility]. Patient has frequent UTI's. Patient was seen in 4/2025 by urology. CT completed in the past showed no obstruction. Noted to have few non obstructing kidney stones. Last urinalysis 5/2025, treated with cipro initially but e-coli was resistant and she was changed to bactrim.  No current symptoms.  Burning sensation resolved.    History of hypertension. She is currently taking metoprolol XL 25 mg daily and losartan 25 mg daily.  Most recent Potassium 4.3 creatinine 0.85 from 2/2025. Blood pressure is adequate to low in office today. However patient is having increased fatigue, recommend patient to decrease metoprolol XL to 12.5 mg daily.  Denies chest pain, shortness of breath, palpitations or peripheral edema. Patient denies any dizziness, shortness of breath or fatigue.  Continue same management.  Re-evaluate in 3 months.      History of chronic renal disease. Most recent creatinine 0.85 , GFR 66 from 2/2025.  Normal renal function.  Currently taking losartan 25 mg daily. Stay hydrated. Avoid nephrotoxic agents such as NSAIDs.  Encouraged patient stay hydrated.  Repeat CMP today.     History of mitral valve murmur. The patient is completely asymptomatic. She has no limitations in functional capacity. Follows with cardiology, last seen on 4/2023. No changes made.  Asymptomatic.  The patient remains active with no exertional symptoms. Last seen by cardiology 5/2025.      Echocardiogram 7/2024  Final Impressions    * Focused study.    * Normal left ventricular size and systolic function, EF 60 %.    * Moderately dilated right ventricle with overall normal systolic function.    * No pericardial effusion.     History of hyperlipidemia. She is currently on atorvastatin 10 mg daily. Tolerating without side effects including myalgias. FLP from 4/2024 with HDL 46, LDL 50.  Low fat diet. Stay active.     History of hypothyroidism.  She is currently taking levothyroxine 88 mcg daily.  last TSH 4.237 from 2/2025. This has normalized from previous at 11.471.  Clinically the patient is euthyroid. Will continue same medication dose. repeat TSH today.        History of osteoporosis. DEXA scan done 9/18/18 showed lowest T score -2.7. She is currently on fosamax 70 mg weekly and daily calcium. She did fall 9/2020 and had a head laceration requiring staples. She reported no falls since. Avoid falls. Weight bearing exercises. Repeat DEXA scan from 2/11/2021  showed lowest T-score -2.9. Discontinued Vitamin D supplement due to normalized levels.  The patient fortunately has not had any recent falls. Repeat DEXA from 11/2023 with lowest T-score of -3.3. Improvement of BMD as above   Continue calcium vitamin-D supplementations and alendronate.     IMPRESSION:  1.  Osteoporosis.  Bone mineral density has significantly increased in the  lumbar spine.  2.  Consider/continue pharmacologic therapy, as appropriate.  3. Consider repeat bone mineral density testing in 2 years, as clinically  appropriate.     History of vitamin D deficiency. vitamin D level at 41.1.  not currently taking any vitamin D supplement.      History of chronic smoker. Patient reports today that she quit smoking, she is using patch.      History of atherosclerosis. This was an incidental finding on previous CT scan. We will continue the same management including aspirin 81 mg daily and atorvastatin 10 mg daily. The patient does not have any vascular claudication or angina symptomatology. Recommend patient to continue with aspirin along due to CAD.  History of atherosclerosis calcifications throughout the aorta on the CT scan from June 2024.  In the absence of bleeding continue with aspirin and anticoagulation  Tortuous, ectatic abdominal aorta, which measures up to 2.4 cm in the  inferior segment. There are extensive atheromatous calcifications  throughout the aorta.     Up to date on colonoscopy. Done 10/23/18. No need to repeat in the absence of symptoms.      Up to date on vaccinations. discussed and advised Shingrix    MEDICATIONS  Medications were reviewed and updated today.  Current Outpatient Medications   Medication Sig   • metoPROLOL succinate (TOPROL-XL) 25 MG 24 hr tablet Take 0.5 tablets by mouth every evening.   • furosemide (LASIX) 20 MG tablet Take 1 tablet by mouth daily.   • diclofenac (VOLTAREN) 1 % gel Apply topically to affected area 4 times a day as needed (do not exceed 32 g/day over  all joints; 16g/day to any joint of lower extremities; 8g/day to any joint of upper extremities)   • fluticasone-vilanterol (BREO ELLIPTA) 100-25 MCG/ACT inhaler Inhale 1 puff into the lungs in the morning   • losartan (COZAAR) 25 MG tablet Take 1 tablet by mouth in the morning   • Acidophilus Lactobacillus Cap Take 1 capsule by mouth daily.   • Cranberry 450 MG Tab Take 450 mg by mouth in the morning and 450 mg in the evening.   • DISPENSE D-mannose 500 mg 1 tablet bid.   • oxygen (O2) gas Inhale 2 L/min into the lungs at bedtime.   • Acetaminophen 500 MG Cap Take 1,000 mg by mouth 2 times daily as needed (mild to moderate pain). Indications: mild to moderate pain   • Respiratory Therapy Supplies (Nebulizer/Tubing/Mouthpiece) Kit To be used with duonebs   • nicotine (NICODERM) 14 MG/24HR patch Place 1 patch onto the skin every morning. Begin taking on February 3, 2025. Remove patch at 8PM   • nystatin (MYCOSTATIN) 221502 UNIT/GM powder Apply 1 Application topically 2 times daily as needed (rash/redness). Begin taking on February 3, 2025.   • acetaminophen (TYLENOL) 500 MG tablet Take 1,000 mg by mouth in the morning and 1,000 mg in the evening. Begin taking on February 3, 2025.   • HYDROcodone-acetaminophen (NORCO) 5-325 MG per tablet Take 1 tablet by mouth every 6 hours as needed for Pain. Indications: severe pain Begin taking on February 3, 2025.   • polyethylene glycol (MiraLax Mix-In [place]) 17 g packet Take 17 g by mouth every other day. Stir and dissolve powder in any 4 to 8 ounces of beverage, then drink.   • pantoprazole (PROTONIX) 20 MG tablet Take 1 tablet by mouth 30 min before breakfast and take 1 tablet by mouth 30 min before dinner daily.   • levothyroxine 100 MCG tablet Take 1 tablet by mouth daily. Except 1.5 tablet on Saturday and Sunday.   • aspirin (ECOTRIN) 81 MG EC tablet Take 1 tablet by mouth daily.   • Multiple Vitamins-Minerals (Ocuvite Adult 50+) capsule Take 1 capsule by mouth in the morning    • atorvastatin (LIPITOR) 10 MG tablet Take 1 tablet by mouth at bedtime   • pramipexole (MIRAPEX) 0.125 MG tablet Take 1 tablet by mouth at bedtime   • Calcium Carb-Cholecalciferol 600-10 MG-MCG Chew Tab Chew 1 each by mouth daily.   • Incruse Ellipta 62.5 MCG/ACT inhaler Inhale 1 puff into the lungs daily.   • diazePAM (VALIUM) 2 MG tablet Take 1 tablet by mouth every 8 hours as needed (muscle spasm).   • ipratropium-albuterol (DUONEB) 0.5-2.5 (3) MG/3ML nebulizer solution Take 3 mLs by nebulization every 6 hours as needed for Wheezing or Shortness of Breath. Begin taking on February 3, 2025.   • bisacodyl (DULCOLAX) 5 MG EC tablet Take 5 mg by mouth daily as needed for Constipation.     No current facility-administered medications for this visit.       ALLERGIES  Allergies as of 06/11/2025   • (No Known Allergies)       REVIEW OF SYSTEMS  See above for details.   General: Has chronic fatigue. No fever. No chills.  Cardiac: No chest pain. No palpitation. No syncope or near syncope. No orthopnea or paroxysmal nocturnal dyspnea.  Respiratory: No cough. No wheezing. No hemoptysis. No shortness of breath.  Gastrointestinal: No nausea. No vomiting. No abdominal pain. No diarrhea. No rectal bleeding. No dysphagia.  Genitourinary: No hematuria. No flank pain. No dysuria. No irritative urinary symptoms. Normal stream.  Neurologic: No headache. No unilateral sensory or motor dysfunction. No dysarthria. No double vision.  Endocrine: No weight change. No dry skin. No palpitation. No hair loss. No constipation.  Skin: No rash, ulcers, or pruritus.  ENT: No runny nose. No sneezing. No nasal drainage.  Eyes: No drainage. No pain.  Mental: No anxiety or depression.    PHYSICAL EXAM  Vitals: Blood pressure 116/60, pulse (!) 58, temperature 96.9 °F (36.1 °C), temperature source Temporal, resp. rate 16, height 5' 2\" (1.575 m), weight 67.9 kg (149 lb 11.2 oz), SpO2 96%.  Wt Readings from Last 3 Encounters:   06/11/25 67.9 kg (149  lb 11.2 oz)   05/12/25 70.8 kg (156 lb 1.6 oz)   02/24/25 64 kg (141 lb)     ENT: Anicteric sclerae. No pallor. Oral mucosa moist. No ulcers, thrush or erythema.  General: Patient is alert, awake, oriented x3, not in acute distress, pleasant. Patient leans to the left, chronic.   Neck: Neck supple. No lymphadenopathy. No jugular venous distention or bruits.  Cardiac: Regular rate and rhythm. No murmur, rubs or gallops. No extra sounds. Point of maximal impulse is normal.  Chest: Clear. No wheezing. No rales. Good air entry. No rhonchi.  Abdomen: Soft. Nontender. Nondistended. No hepatosplenomegaly.  Legs: Normal. No edema. No deep vein thrombosis.  Skin: Warm and dry with no rashes.  Neurological examination: Motor function in the upper extremities is normal. Motor function in the lower extremities is normal. Gait is stable. Dorsiflexion is normal. No hemifacial droop. Cranial nerve examination 2-12 grossly intact. Sensory examination is normal with adequate sensation. Tongue is midline, extraocular movements intact.     LABORATORY  I have reviewed the pertinent laboratory tests. These are the pertinent findings:  Lab Results   Component Value Date    CHOLESTEROL 120 04/02/2024    HDL 46 (L) 04/02/2024    CALCLDL 50 04/02/2024    LDLDIR 120 04/23/2012    TRIGLYCERIDE 118 04/02/2024    POTASSIUM 4.3 02/11/2025    CREATININE 0.85 02/11/2025    AST 10 01/28/2025    BILIRUBIN 0.4 01/28/2025    TSH 4.237 02/11/2025    WBC 11.2 (H) 02/11/2025    HGB 12.0 02/11/2025     02/11/2025    BNP 1,162 (H) 09/09/2014    VITD25 39.1 07/29/2024       ASSESSMENT:  1. Acquired hypothyroidism    2. Essential hypertension    3. Paroxysmal atrial fibrillation  (CMD)    4. Acute exacerbation of chronic obstructive pulmonary disease (COPD)  (CMD)    5. History of recurrent UTIs    6. Valvular heart disease    7. Vitamin D deficiency    8. Coronary artery calcification seen on CT scan         1. Recurrent UTIs: Currently no burning or  pain, increased urinary frequency.  - Recent urine culture: E. coli resistant to ciprofloxacin.  - Treated with Bactrim for 7 days.  - KUB from July: 2 kidney stones, one 9 mm, unlikely to pass naturally; surgical intervention not recommended unless necessary.  - Follow-up with urology, monitor for new symptoms.    2. COPD: Stable.  - Using DuoNebs, Breo Ellipta, and prednisone inhalers.  - Breathing comfortably with these medications.  - Continue inhalers, remain active.  - Monitor respiratory status.    3. Paroxysmal atrial fibrillation: History of irregular heartbeat.  - Anticoagulant therapy discontinued due to fall risk.  - Monitor for new episodes.  - Regular follow-up to assess cardiac status.  Heart rate on the lower side today with fatigue  Decrease metoprolol to 12.5 Mg daily.  Reassess in 3 months.    4. Hypertension: Current /60.  - On losartan 25 mg and metoprolol 25 mg daily.  - Metoprolol dosage reduced to half a tablet daily due to low BP and heart rate, to alleviate fatigue.  - Monitor BP and heart rate regularly.    5. Hyperlipidemia.  - On statin for cholesterol management.  - Monitor lipid levels, adjust therapy as needed.  - Encourage healthy diet and lifestyle.    6. Hypothyroidism: Dosage increased from 88 mcg to 100 mcg since 12/2024.  - On levothyroxine.  - Monitor thyroid function tests.  - Assess for symptoms of hypothyroidism or hyperthyroidism.    7. Chronic anemia.  - Chronic anemia present, no current symptoms.  - Monitor hemoglobin and hematocrit levels.  CBC today.  - Ensure adequate nutrition, address underlying causes.    Follow-up  - Follow up in 3 months.       Patient Instructions   Decrease metoprolol from 25 mg daily down to half tablet daily because of low blood pressure and low heart rate hopefully this will improve the fatigue  Other medications stay the same  Today CBC CMP magnesium level TSH  Continue with inhalers  Try to remain active  Use a walker  Fall  precautions  See me in 3 months to follow-up on the blood pressure    All the above was discussed with the patient and her niece/POA in detail; questions were answered to the patient's satisfaction.  Patient left the office in stable condition with verbal and written instructions.        Yes